# Patient Record
Sex: FEMALE | Race: WHITE | Employment: FULL TIME | URBAN - METROPOLITAN AREA
[De-identification: names, ages, dates, MRNs, and addresses within clinical notes are randomized per-mention and may not be internally consistent; named-entity substitution may affect disease eponyms.]

---

## 2017-01-09 ENCOUNTER — TELEPHONE (OUTPATIENT)
Dept: FAMILY MEDICINE CLINIC | Age: 37
End: 2017-01-09

## 2017-01-10 RX ORDER — POLYMYXIN B SULFATE AND TRIMETHOPRIM 1; 10000 MG/ML; [USP'U]/ML
1 SOLUTION OPHTHALMIC EVERY 4 HOURS
Qty: 1 BOTTLE | Refills: 0 | Status: SHIPPED | OUTPATIENT
Start: 2017-01-10 | End: 2017-01-20

## 2017-01-19 ENCOUNTER — OFFICE VISIT (OUTPATIENT)
Dept: BARIATRICS/WEIGHT MGMT | Age: 37
End: 2017-01-19

## 2017-01-19 VITALS
BODY MASS INDEX: 31.98 KG/M2 | HEART RATE: 72 BPM | RESPIRATION RATE: 15 BRPM | WEIGHT: 169.4 LBS | SYSTOLIC BLOOD PRESSURE: 119 MMHG | DIASTOLIC BLOOD PRESSURE: 77 MMHG | HEIGHT: 61 IN

## 2017-01-19 DIAGNOSIS — E11.69 DIABETES MELLITUS TYPE 2 IN OBESE (HCC): ICD-10-CM

## 2017-01-19 DIAGNOSIS — Z98.84 S/P LAPAROSCOPIC SLEEVE GASTRECTOMY: Primary | ICD-10-CM

## 2017-01-19 DIAGNOSIS — E66.01 MORBID OBESITY DUE TO EXCESS CALORIES (HCC): ICD-10-CM

## 2017-01-19 DIAGNOSIS — E66.9 DIABETES MELLITUS TYPE 2 IN OBESE (HCC): ICD-10-CM

## 2017-01-19 DIAGNOSIS — E78.5 HYPERLIPIDEMIA, UNSPECIFIED HYPERLIPIDEMIA TYPE: ICD-10-CM

## 2017-01-19 PROCEDURE — 99213 OFFICE O/P EST LOW 20 MIN: CPT | Performed by: SURGERY

## 2017-02-23 ENCOUNTER — PATIENT MESSAGE (OUTPATIENT)
Dept: FAMILY MEDICINE CLINIC | Age: 37
End: 2017-02-23

## 2017-03-03 RX ORDER — BENZONATATE 100 MG/1
100 CAPSULE ORAL 3 TIMES DAILY PRN
Qty: 30 CAPSULE | Refills: 0 | Status: SHIPPED | OUTPATIENT
Start: 2017-03-03 | End: 2017-03-13

## 2017-03-03 RX ORDER — ALBUTEROL SULFATE 90 UG/1
2 AEROSOL, METERED RESPIRATORY (INHALATION) EVERY 6 HOURS PRN
Qty: 1 INHALER | Refills: 3 | Status: SHIPPED | OUTPATIENT
Start: 2017-03-03 | End: 2017-12-15 | Stop reason: SDUPTHER

## 2017-03-06 RX ORDER — ERGOCALCIFEROL 1.25 MG/1
CAPSULE ORAL
Qty: 36 CAPSULE | Refills: 5 | Status: SHIPPED | OUTPATIENT
Start: 2017-03-06 | End: 2017-06-26 | Stop reason: SDUPTHER

## 2017-05-04 ENCOUNTER — PATIENT MESSAGE (OUTPATIENT)
Dept: BARIATRICS/WEIGHT MGMT | Age: 37
End: 2017-05-04

## 2017-05-09 ENCOUNTER — TELEPHONE (OUTPATIENT)
Dept: BARIATRICS/WEIGHT MGMT | Age: 37
End: 2017-05-09

## 2017-05-18 ENCOUNTER — OFFICE VISIT (OUTPATIENT)
Dept: URGENT CARE | Age: 37
End: 2017-05-18

## 2017-05-18 VITALS
TEMPERATURE: 97.6 F | WEIGHT: 178 LBS | DIASTOLIC BLOOD PRESSURE: 75 MMHG | HEIGHT: 60 IN | BODY MASS INDEX: 34.95 KG/M2 | HEART RATE: 80 BPM | SYSTOLIC BLOOD PRESSURE: 112 MMHG | OXYGEN SATURATION: 97 %

## 2017-05-18 DIAGNOSIS — H81.399 PERIPHERAL VERTIGO, UNSPECIFIED LATERALITY: Primary | ICD-10-CM

## 2017-05-18 PROCEDURE — 99203 OFFICE O/P NEW LOW 30 MIN: CPT | Performed by: EMERGENCY MEDICINE

## 2017-05-18 RX ORDER — MECLIZINE HYDROCHLORIDE 25 MG/1
25 TABLET ORAL 3 TIMES DAILY PRN
Qty: 20 TABLET | Refills: 0 | Status: SHIPPED | OUTPATIENT
Start: 2017-05-18 | End: 2017-06-26 | Stop reason: ALTCHOICE

## 2017-05-18 ASSESSMENT — ENCOUNTER SYMPTOMS
EYE DISCHARGE: 0
NAUSEA: 1
VOMITING: 0
DIARRHEA: 0
COUGH: 0
EYE PAIN: 0
RHINORRHEA: 0
ABDOMINAL PAIN: 0
CHANGE IN BOWEL HABIT: 0
SORE THROAT: 0
TROUBLE SWALLOWING: 0
SHORTNESS OF BREATH: 0

## 2017-06-01 RX ORDER — MONTELUKAST SODIUM 10 MG/1
TABLET ORAL
Qty: 90 TABLET | Refills: 2 | Status: SHIPPED | OUTPATIENT
Start: 2017-06-01 | End: 2018-02-26 | Stop reason: SDUPTHER

## 2017-06-19 ENCOUNTER — OFFICE VISIT (OUTPATIENT)
Dept: BARIATRICS/WEIGHT MGMT | Age: 37
End: 2017-06-19

## 2017-06-19 VITALS
WEIGHT: 179.8 LBS | BODY MASS INDEX: 33.95 KG/M2 | HEART RATE: 72 BPM | DIASTOLIC BLOOD PRESSURE: 72 MMHG | SYSTOLIC BLOOD PRESSURE: 118 MMHG | HEIGHT: 61 IN

## 2017-06-19 DIAGNOSIS — E66.9 CLASS 1 OBESITY: Primary | ICD-10-CM

## 2017-06-19 DIAGNOSIS — E78.5 HYPERLIPIDEMIA, UNSPECIFIED HYPERLIPIDEMIA TYPE: ICD-10-CM

## 2017-06-19 DIAGNOSIS — Z79.899 HIGH RISK MEDICATIONS (NOT ANTICOAGULANTS) LONG-TERM USE: ICD-10-CM

## 2017-06-19 DIAGNOSIS — E11.9 TYPE 2 DIABETES MELLITUS WITHOUT COMPLICATION, UNSPECIFIED LONG TERM INSULIN USE STATUS: ICD-10-CM

## 2017-06-19 DIAGNOSIS — Z98.84 S/P LAPAROSCOPIC SLEEVE GASTRECTOMY: ICD-10-CM

## 2017-06-19 PROCEDURE — 99215 OFFICE O/P EST HI 40 MIN: CPT | Performed by: FAMILY MEDICINE

## 2017-06-19 ASSESSMENT — PATIENT HEALTH QUESTIONNAIRE - PHQ9
SUM OF ALL RESPONSES TO PHQ QUESTIONS 1-9: 0
2. FEELING DOWN, DEPRESSED OR HOPELESS: 0
SUM OF ALL RESPONSES TO PHQ9 QUESTIONS 1 & 2: 0
1. LITTLE INTEREST OR PLEASURE IN DOING THINGS: 0

## 2017-06-19 ASSESSMENT — ENCOUNTER SYMPTOMS
RESPIRATORY NEGATIVE: 1
EYES NEGATIVE: 1
GASTROINTESTINAL NEGATIVE: 1

## 2017-06-21 LAB
A/G RATIO: 1.6 (ref 1.1–2.2)
ALBUMIN SERPL-MCNC: 4.6 G/DL (ref 3.4–5)
ALP BLD-CCNC: 69 U/L (ref 40–129)
ALT SERPL-CCNC: 12 U/L (ref 10–40)
ANION GAP SERPL CALCULATED.3IONS-SCNC: 18 MMOL/L (ref 3–16)
AST SERPL-CCNC: 13 U/L (ref 15–37)
BASOPHILS ABSOLUTE: 0 K/UL (ref 0–0.2)
BASOPHILS RELATIVE PERCENT: 0.4 %
BILIRUB SERPL-MCNC: 0.7 MG/DL (ref 0–1)
BUN BLDV-MCNC: 12 MG/DL (ref 7–20)
CALCIUM SERPL-MCNC: 9.6 MG/DL (ref 8.3–10.6)
CHLORIDE BLD-SCNC: 100 MMOL/L (ref 99–110)
CHOLESTEROL, TOTAL: 172 MG/DL (ref 0–199)
CO2: 23 MMOL/L (ref 21–32)
CREAT SERPL-MCNC: 0.5 MG/DL (ref 0.6–1.1)
EOSINOPHILS ABSOLUTE: 0.2 K/UL (ref 0–0.6)
EOSINOPHILS RELATIVE PERCENT: 2 %
ESTIMATED AVERAGE GLUCOSE: 111.2 MG/DL
FERRITIN: 51.4 NG/ML (ref 15–150)
FOLATE: >20 NG/ML (ref 4.78–24.2)
GFR AFRICAN AMERICAN: >60
GFR NON-AFRICAN AMERICAN: >60
GLOBULIN: 2.8 G/DL
GLUCOSE BLD-MCNC: 115 MG/DL (ref 70–99)
HBA1C MFR BLD: 5.5 %
HCT VFR BLD CALC: 44.3 % (ref 36–48)
HDLC SERPL-MCNC: 51 MG/DL (ref 40–60)
HEMOGLOBIN: 14.9 G/DL (ref 12–16)
IRON SATURATION: 59 % (ref 15–50)
IRON: 175 UG/DL (ref 37–145)
LDL CHOLESTEROL CALCULATED: 101 MG/DL
LYMPHOCYTES ABSOLUTE: 3.1 K/UL (ref 1–5.1)
LYMPHOCYTES RELATIVE PERCENT: 39.6 %
MCH RBC QN AUTO: 31 PG (ref 26–34)
MCHC RBC AUTO-ENTMCNC: 33.7 G/DL (ref 31–36)
MCV RBC AUTO: 92.2 FL (ref 80–100)
MONOCYTES ABSOLUTE: 0.4 K/UL (ref 0–1.3)
MONOCYTES RELATIVE PERCENT: 5.2 %
NEUTROPHILS ABSOLUTE: 4.1 K/UL (ref 1.7–7.7)
NEUTROPHILS RELATIVE PERCENT: 52.8 %
PDW BLD-RTO: 12.4 % (ref 12.4–15.4)
PLATELET # BLD: 215 K/UL (ref 135–450)
PMV BLD AUTO: 10 FL (ref 5–10.5)
POTASSIUM SERPL-SCNC: 4.5 MMOL/L (ref 3.5–5.1)
RBC # BLD: 4.81 M/UL (ref 4–5.2)
SODIUM BLD-SCNC: 141 MMOL/L (ref 136–145)
TOTAL IRON BINDING CAPACITY: 296 UG/DL (ref 260–445)
TOTAL PROTEIN: 7.4 G/DL (ref 6.4–8.2)
TRIGL SERPL-MCNC: 98 MG/DL (ref 0–150)
TSH REFLEX: 1.11 UIU/ML (ref 0.27–4.2)
VITAMIN B-12: 487 PG/ML (ref 211–911)
VITAMIN D 25-HYDROXY: 38.3 NG/ML
VLDLC SERPL CALC-MCNC: 20 MG/DL
WBC # BLD: 7.7 K/UL (ref 4–11)

## 2017-06-26 ENCOUNTER — OFFICE VISIT (OUTPATIENT)
Dept: DERMATOLOGY | Age: 37
End: 2017-06-26

## 2017-06-26 DIAGNOSIS — Z12.83 SCREENING EXAM FOR SKIN CANCER: ICD-10-CM

## 2017-06-26 DIAGNOSIS — Z80.8 FAMILY HISTORY OF MELANOMA: ICD-10-CM

## 2017-06-26 DIAGNOSIS — D48.5 NEOPLASM OF UNCERTAIN BEHAVIOR OF SKIN: ICD-10-CM

## 2017-06-26 DIAGNOSIS — D22.9 MULTIPLE BENIGN NEVI: Primary | ICD-10-CM

## 2017-06-26 PROCEDURE — 11100 PR BIOPSY OF SKIN LESION: CPT | Performed by: DERMATOLOGY

## 2017-06-26 PROCEDURE — 99213 OFFICE O/P EST LOW 20 MIN: CPT | Performed by: DERMATOLOGY

## 2017-06-28 ENCOUNTER — OFFICE VISIT (OUTPATIENT)
Dept: BARIATRICS/WEIGHT MGMT | Age: 37
End: 2017-06-28

## 2017-06-28 VITALS
BODY MASS INDEX: 33.89 KG/M2 | WEIGHT: 179.5 LBS | SYSTOLIC BLOOD PRESSURE: 100 MMHG | DIASTOLIC BLOOD PRESSURE: 78 MMHG | HEIGHT: 61 IN | HEART RATE: 76 BPM

## 2017-06-28 DIAGNOSIS — Z98.84 S/P LAPAROSCOPIC SLEEVE GASTRECTOMY: ICD-10-CM

## 2017-06-28 DIAGNOSIS — Z71.3 DIETARY COUNSELING AND SURVEILLANCE: ICD-10-CM

## 2017-06-28 DIAGNOSIS — E66.9 CLASS 1 OBESITY: Primary | ICD-10-CM

## 2017-06-28 DIAGNOSIS — Z79.899 HIGH RISK MEDICATIONS (NOT ANTICOAGULANTS) LONG-TERM USE: ICD-10-CM

## 2017-06-28 PROCEDURE — 99214 OFFICE O/P EST MOD 30 MIN: CPT | Performed by: FAMILY MEDICINE

## 2017-06-28 ASSESSMENT — ENCOUNTER SYMPTOMS
RESPIRATORY NEGATIVE: 1
EYES NEGATIVE: 1
GASTROINTESTINAL NEGATIVE: 1

## 2017-06-30 ENCOUNTER — TELEPHONE (OUTPATIENT)
Dept: DERMATOLOGY | Age: 37
End: 2017-06-30

## 2017-07-28 ENCOUNTER — OFFICE VISIT (OUTPATIENT)
Dept: BARIATRICS/WEIGHT MGMT | Age: 37
End: 2017-07-28

## 2017-07-28 VITALS
HEIGHT: 61 IN | BODY MASS INDEX: 33.23 KG/M2 | SYSTOLIC BLOOD PRESSURE: 112 MMHG | DIASTOLIC BLOOD PRESSURE: 70 MMHG | WEIGHT: 176 LBS | RESPIRATION RATE: 16 BRPM | HEART RATE: 96 BPM

## 2017-07-28 DIAGNOSIS — E66.9 OBESITY (BMI 30.0-34.9): Primary | ICD-10-CM

## 2017-07-28 DIAGNOSIS — E78.5 HYPERLIPIDEMIA, UNSPECIFIED HYPERLIPIDEMIA TYPE: ICD-10-CM

## 2017-07-28 DIAGNOSIS — E66.9 DIABETES MELLITUS TYPE 2 IN OBESE (HCC): ICD-10-CM

## 2017-07-28 DIAGNOSIS — E11.69 DIABETES MELLITUS TYPE 2 IN OBESE (HCC): ICD-10-CM

## 2017-07-28 DIAGNOSIS — E55.9 VITAMIN D DEFICIENCY: ICD-10-CM

## 2017-07-28 DIAGNOSIS — Z98.84 S/P LAPAROSCOPIC SLEEVE GASTRECTOMY: ICD-10-CM

## 2017-07-28 PROCEDURE — 99213 OFFICE O/P EST LOW 20 MIN: CPT | Performed by: NURSE PRACTITIONER

## 2017-07-28 ASSESSMENT — ENCOUNTER SYMPTOMS
RESPIRATORY NEGATIVE: 1
GASTROINTESTINAL NEGATIVE: 1
EYES NEGATIVE: 1

## 2017-08-18 ENCOUNTER — OFFICE VISIT (OUTPATIENT)
Dept: FAMILY MEDICINE CLINIC | Age: 37
End: 2017-08-18

## 2017-08-18 VITALS
BODY MASS INDEX: 33.42 KG/M2 | OXYGEN SATURATION: 98 % | HEART RATE: 72 BPM | WEIGHT: 177 LBS | SYSTOLIC BLOOD PRESSURE: 110 MMHG | HEIGHT: 61 IN | DIASTOLIC BLOOD PRESSURE: 70 MMHG

## 2017-08-18 DIAGNOSIS — H81.12 BPPV (BENIGN PAROXYSMAL POSITIONAL VERTIGO), LEFT: Primary | ICD-10-CM

## 2017-08-18 PROCEDURE — 99213 OFFICE O/P EST LOW 20 MIN: CPT | Performed by: FAMILY MEDICINE

## 2017-08-18 RX ORDER — ERYTHROMYCIN 5 MG/G
OINTMENT OPHTHALMIC
Refills: 4 | COMMUNITY
Start: 2017-08-07 | End: 2017-10-02

## 2017-08-18 RX ORDER — ONDANSETRON 4 MG/1
4 TABLET, FILM COATED ORAL EVERY 8 HOURS PRN
Qty: 20 TABLET | Refills: 0 | Status: SHIPPED | OUTPATIENT
Start: 2017-08-18 | End: 2018-10-27 | Stop reason: ALTCHOICE

## 2017-08-21 ENCOUNTER — OFFICE VISIT (OUTPATIENT)
Dept: BARIATRICS/WEIGHT MGMT | Age: 37
End: 2017-08-21

## 2017-08-21 ENCOUNTER — HOSPITAL ENCOUNTER (OUTPATIENT)
Dept: PHYSICAL THERAPY | Age: 37
Discharge: HOME | End: 2017-08-31
Admitting: FAMILY MEDICINE

## 2017-08-21 VITALS
HEIGHT: 61 IN | WEIGHT: 175 LBS | SYSTOLIC BLOOD PRESSURE: 110 MMHG | BODY MASS INDEX: 33.04 KG/M2 | HEART RATE: 72 BPM | DIASTOLIC BLOOD PRESSURE: 72 MMHG

## 2017-08-21 DIAGNOSIS — E66.9 CLASS 1 OBESITY: Primary | ICD-10-CM

## 2017-08-21 DIAGNOSIS — E66.9 OBESITY (BMI 30.0-34.9): ICD-10-CM

## 2017-08-21 DIAGNOSIS — Z71.3 DIETARY COUNSELING AND SURVEILLANCE: ICD-10-CM

## 2017-08-21 PROCEDURE — 99213 OFFICE O/P EST LOW 20 MIN: CPT | Performed by: FAMILY MEDICINE

## 2017-08-21 ASSESSMENT — ENCOUNTER SYMPTOMS
GASTROINTESTINAL NEGATIVE: 1
EYES NEGATIVE: 1
RESPIRATORY NEGATIVE: 1

## 2017-08-28 ENCOUNTER — HOSPITAL ENCOUNTER (OUTPATIENT)
Dept: PHYSICAL THERAPY | Age: 37
Discharge: HOME | End: 2017-08-28

## 2017-09-01 ENCOUNTER — HOSPITAL ENCOUNTER (OUTPATIENT)
Dept: PHYSICAL THERAPY | Age: 37
Discharge: HOME | End: 2017-09-01

## 2017-09-05 ENCOUNTER — HOSPITAL ENCOUNTER (OUTPATIENT)
Dept: PHYSICAL THERAPY | Age: 37
Discharge: HOME | End: 2017-09-05

## 2017-10-02 ENCOUNTER — OFFICE VISIT (OUTPATIENT)
Dept: BARIATRICS/WEIGHT MGMT | Age: 37
End: 2017-10-02

## 2017-10-02 VITALS
HEART RATE: 76 BPM | SYSTOLIC BLOOD PRESSURE: 128 MMHG | WEIGHT: 176 LBS | DIASTOLIC BLOOD PRESSURE: 86 MMHG | BODY MASS INDEX: 33.23 KG/M2 | HEIGHT: 61 IN

## 2017-10-02 DIAGNOSIS — E66.9 CLASS 1 OBESITY: Primary | ICD-10-CM

## 2017-10-02 DIAGNOSIS — Z71.3 DIETARY COUNSELING AND SURVEILLANCE: ICD-10-CM

## 2017-10-02 PROCEDURE — 99213 OFFICE O/P EST LOW 20 MIN: CPT | Performed by: FAMILY MEDICINE

## 2017-10-02 ASSESSMENT — ENCOUNTER SYMPTOMS
EYES NEGATIVE: 1
RESPIRATORY NEGATIVE: 1
GASTROINTESTINAL NEGATIVE: 1

## 2017-10-02 NOTE — MR AVS SNAPSHOT
After Visit Summary             Brigette Moreira   10/2/2017 9:15 AM   Office Visit    Description:  Female : 1980   Provider:  Danielle Winters MD   Department:  [facility]              Your Follow-Up and Future Appointments         Below is a list of your follow-up and future appointments. This may not be a complete list as you may have made appointments directly with providers that we are not aware of or your providers may have made some for you. Please call your providers to confirm appointments. It is important to keep your appointments. Please bring your current insurance card, photo ID, co-pay, and all medication bottles to your appointment. If self-pay, payment is expected at the time of service. Your To-Do List     Future Appointments Provider Department Dept Phone    11/3/2017 12:15 PM Danielle Winters MD [facility] [phone]    Please arrive 15 minutes prior to appointment time, bring insurance card and photo ID.     2017 9:00 AM Tia Veliz MD [facility]in[facility] Diabetes [phone]    Please arrive 15 minutes prior to appointment time, bring insurance card and photo ID.     2018 4:00 PM Radha Ramey MD [facility] Dermatology [phone]    Please arrive 15 minutes prior to appointment, bring photo ID and insurance card. Follow-Up    Return in about 4 weeks (around 10/30/2017).          Information from Your Visit        Department     Name Address Phone Fax    [address] [facility] [address] [place] [address] [address] [facility],Third Floor [phone]      You Were Seen for:         Comments    Class 1 obesity   [[id number]]         Vital Signs     Blood Pressure Pulse Height Weight Body Mass Index Smoking Status    128/86 76 5' 0.5\" (1.537 m) 176 lb (79.8 kg) 33.81 kg/m2 Never Smoker      Additional Information about your Body Mass Index (BMI) eat, how active you are, and other things affect how your body uses calories and whether you gain weight. If you have family members who have too much body fat, you may have inherited a tendency to gain weight. And your family also helps form your eating and lifestyle habits, which can lead to obesity. Also, our busy lives make it harder to plan and cook healthy meals. For many of us, it's easier to reach for prepared foods, go out to eat, or go to the drive-through. But these foods are often high in saturated fat and calories. Portions are often too large. What can you do to reach a healthy weight? Focus on health, not diets. Diets are hard to stay on and don't work in the long run. It is very hard to stay with a diet that includes lots of big changes in your eating habits. Instead of a diet, focus on lifestyle changes that will improve your health and achieve the right balance of energy and calories. To lose weight, you need to burn more calories than you take in. You can do it by eating healthy foods in reasonable amounts and becoming more active, even a little bit every day. Making small changes over time can add up to a lot. Make a plan for change. Many people have found that naming their reasons for change and staying focused on their plan can make a big difference. Work with your doctor to create a plan that is right for you. · Ask yourself: [place] are my personal, most powerful reasons for wanting this change? What will my life look like when I've made the change? \"  · Set your long-term goal. Make it specific, such as \"I will lose x pounds. \"  · Break your long-term goal into smaller, short-term goals. Make these small steps specific and within your reach, things you know you can do. These steps are what keep you going from day to day. How can you stay on your plan for change? Be ready.  Choose to start during a time when there are few events that problems. It's also a good idea to know your test results and keep a list of the medicines you take. Where can you learn more? Go to https://chpepiceweb.HauteDay. org and sign in to your Oportunista account. Enter N111 in the [employer] box to learn more about \"Learning About Obesity. \"     If you do not have an account, please click on the \"Sign Up Now\" link. Current as of: October 13, 2016  Content Version: 11.3  © 6794-3597 [employer]. Care instructions adapted under license by [facility] ([facility]). If you have questions about a medical condition or this instruction, always ask your healthcare professional. [place] [address] any warranty or liability for your use of this information. Today's Medication Changes          These changes are accurate as of: 10/2/17 10:13 AM.  If you have any questions, ask your nurse or doctor. STOP taking these medications           erythromycin 5 MG/GM ophthalmic ointment   Commonly known as:  ROMYCIN   Stopped by:  Gabrielle Felton MD               Your Current Medications Are              ondansetron (ZOFRAN) 4 MG tablet Take 1 tablet by mouth every 8 hours as needed for Nausea    montelukast (SINGULAIR) 10 MG tablet TAKE 1 TABLET DAILY    albuterol sulfate HFA (PROAIR HFA) 108 (90 BASE) MCG/ACT inhaler Inhale 2 puffs into the lungs every 6 hours as needed for Wheezing    zolpidem (AMBIEN) 5 MG tablet TAKE ONE TABLET BY MOUTH EVERY NIGHT AS NEEDED FOR SLEEP. Multiple Vitamin (ONE-A-DAY ESSENTIAL PO) Take 2 tablets by mouth daily    SYMBICORT 80-4.5 MCG/ACT AERO USE 2 INHALATIONS TWICE A DAY    Calcium Citrate-Vitamin D 200-250 MG-UNIT TABS Take 1 tablet by mouth daily    calcium carbonate (OSCAL) 500 MG TABS tablet Take 500 mg by mouth daily    omeprazole (PRILOSEC) 40 MG capsule Take 1 capsule by mouth daily    levonorgestrel (MIRENA) 20 MCG/24HR IUD 1 Each by Intrauterine route once.       Allergies Codeine Nausea And Vomiting    Elavil [Amitriptyline] Nausea And Vomiting         Additional Information        Basic Information     Date Of Birth Sex Race Ethnicity Preferred Language    1980 Female White Non-/Non  English      Problem List as of 10/2/2017  Date Reviewed: 10/2/2017                Morbid obesity ([facility] [address]    Diabetes mellitus type 2 in obese ([facility] [address]    Hypertriglyceridemia    Hyperlipidemia    Vitamin D deficiency    Asthma    Obesity (BMI 30.0-34. 9)    Shoulder impingement    S/P laparoscopic sleeve gastrectomy    Right upper quadrant abdominal pain    Nausea    Hip flexor tendinitis      Your Goals as of 10/2/2017 at 10:13 AM              6/20/17    9/16/16    3/11/16       Result Component    HEMOGLOBIN A1C < 7.0   5.5  5.1  5.8    Notes    The medications were discussed with the patient and the physician. Prescription and over the counter medicines reviewed. Pt is taking medication as prescribed any any side effects or barriers such as difficulty in taking the medication reveiwed. The purpose, side effects, dose of medication of new medications were explained to the patient and questions answered by the physician. The patients understands the information concerning medication. Individual treatment plan developed:Self-management plan and goals developed and discussed. Resources given. See patient instructions. Medication treatment plan developed by the physician. See orders. Healthy behavior discussed: Preventative behaviors discussed regarding healthy diet, exercise, and not smoking. Pt already has tool to record self care of BS or high blood pressure results, regular paper ok. Self management confidence of patient being able to put goal into action assessed: medium confidence. Barriers to treatment evaluated:none unless otherwise noted in the HPI.               Immunizations as of 10/2/2017     Name Date

## 2017-10-02 NOTE — PATIENT INSTRUCTIONS
Learning About Obesity  What is obesity? Obesity means having so much body fat that your health is in danger. Having too much body fat can lead to type 2 diabetes, heart disease, high blood pressure, arthritis, sleep apnea, and stroke. Even if you don't feel bad now, think about these health risks. Do they seem like a good reason to start on a new path toward a healthier weight? Or do you have another personal, powerful reason for wanting to lose weight? Whatever it is, keep it in mind. It can be hard to change eating habits and exercise habits. But with your own reason and plan, you can do it. How do you know if your weight is in the obesity range? To know if your weight is in the obesity range, your doctor looks at your body mass index (BMI) and waist size. Your BMI is a number that is calculated from your weight and your height. To figure your BMI for yourself, get a BMI table from your doctor or use an online tool, such as http://www.OnAir Player.com/ on the [facility] [place]. What causes obesity? When you take in more calories than you burn off, you gain weight. How you eat, how active you are, and other things affect how your body uses calories and whether you gain weight. If you have family members who have too much body fat, you may have inherited a tendency to gain weight. And your family also helps form your eating and lifestyle habits, which can lead to obesity. Also, our busy lives make it harder to plan and cook healthy meals. For many of us, it's easier to reach for prepared foods, go out to eat, or go to the drive-through. But these foods are often high in saturated fat and calories. Portions are often too large. What can you do to reach a healthy weight? Focus on health, not diets. Diets are hard to stay on and don't work in the long run.  It is very hard to stay with a diet that includes lots of big changes in your eating habits. Instead of a diet, focus on lifestyle changes that will improve your health and achieve the right balance of energy and calories. To lose weight, you need to burn more calories than you take in. You can do it by eating healthy foods in reasonable amounts and becoming more active, even a little bit every day. Making small changes over time can add up to a lot. Make a plan for change. Many people have found that naming their reasons for change and staying focused on their plan can make a big difference. Work with your doctor to create a plan that is right for you. · Ask yourself: Scott Beck are my personal, most powerful reasons for wanting this change? What will my life look like when I've made the change? \"  · Set your long-term goal. Make it specific, such as \"I will lose x pounds. \"  · Break your long-term goal into smaller, short-term goals. Make these small steps specific and within your reach, things you know you can do. These steps are what keep you going from day to day. How can you stay on your plan for change? Be ready. Choose to start during a time when there are few events that might trigger slip-ups, like holidays, social events, and high-stress periods. Decide on your first few steps. Most people have more success when they make small changes, one step at a time. For example, you might switch a daily candy bar to a piece of fruit, walk 10 minutes more, or add more vegetables to a meal.  Line up your support people. Make sure you're not going to be alone as you make this change. Connect with people who understand how important it is to you. Ask family members and friends for help in keeping with your plan. And think about who could make it harder for you, and how to handle them. Try tracking. People who keep track of what they eat, feel, and do are better at losing weight. Try writing down things like:  · What and how much you eat.   · How you feel before and after each meal.  · Details about each meal (like eating out or at home, eating alone, or with friends or family). · What you do to be active. Look and plan. As you track, look for patterns that you may want to change. Take note of:  · When you eat and whether you skip meals. · How often you eat out. · How many fruits and vegetables you eat. · When you eat beyond feeling full. · When and why you eat for reasons other than being hungry. When you stray from your plan, don't get upset. Figure out what made you slip up and how you can fix it. Can you take medicines or have surgery to lose weight? Before your doctor will prescribe medicines or surgery, he or she will probably want you to be more active and follow your healthy eating plan for a period of time. These habits are key lifelong changes for managing your weight, with or without other medical treatment. And these changes can help you avoid weight-related health problems. Follow-up care is a key part of your treatment and safety. Be sure to make and go to all appointments, and call your doctor if you are having problems. It's also a good idea to know your test results and keep a list of the medicines you take. Where can you learn more? Go to https://Intertainment MediapeMozy.Productify. org and sign in to your AppGate Network Security account. Enter N111 in the [facility] box to learn more about \"Learning About Obesity. \"     If you do not have an account, please click on the \"Sign Up Now\" link. Current as of: October 13, 2016  Content Version: 11.3  © 4126-9501 [employer]. Care instructions adapted under license by [facility] ([facility]). If you have questions about a medical condition or this instruction, always ask your healthcare professional. [address] any warranty or liability for your use of this information.

## 2017-10-02 NOTE — PROGRESS NOTES
Patient: Cristal Moreira                      Encounter Date: 10/2/2017    YOB: 1980                Age: 42 y.o. Chief Complaint   Patient presents with    Weight Management     5th MWM, Belviq, s/p sleeve 10/2015       /86  Pulse 76  Ht 5' 0.5\" (1.537 m)  Wt 176 lb (79.8 kg)  BMI 33.81 kg/m2    Body mass index is 33.81 kg/(m^2). Waist Circumference  Waist (Inches): 39 in    HPI:  43 y.o. female with a long-standing history of obesity s/p sleeve 10/2015 presents today for follow-up. She has gained 1 pounds since her last visit on 8/21. Current treatment includes Belviq and 1200-calorie, low carb diet. Tolerating it well. Met with the dietitian today. Food recall reviewed. Hasn't been making consistent dietary changes. Medication(s): Appetite well controlled? [x]Fair    Focus:     []Good     [x]Fair     []Poor        Side effects? No       Any recent change in medication(s)?  No    Exercise: [x]Cardio     []Resistance/strength training     []Other:     Allergies   Allergen Reactions    Codeine Nausea And Vomiting    Elavil [Amitriptyline] Nausea And Vomiting         Current Outpatient Prescriptions:     ondansetron (ZOFRAN) 4 MG tablet, Take 1 tablet by mouth every 8 hours as needed for Nausea, Disp: 20 tablet, Rfl: 0    montelukast (SINGULAIR) 10 MG tablet, TAKE 1 TABLET DAILY, Disp: 90 tablet, Rfl: 2    albuterol sulfate HFA (PROAIR HFA) 108 (90 BASE) MCG/ACT inhaler, Inhale 2 puffs into the lungs every 6 hours as needed for Wheezing, Disp: 1 Inhaler, Rfl: 3    zolpidem (AMBIEN) 5 MG tablet, TAKE ONE TABLET BY MOUTH EVERY NIGHT AS NEEDED FOR SLEEP., Disp: 30 tablet, Rfl: 2    Multiple Vitamin (ONE-A-DAY ESSENTIAL PO), Take 2 tablets by mouth daily, Disp: , Rfl:     SYMBICORT 80-4.5 MCG/ACT AERO, USE 2 INHALATIONS TWICE A DAY, Disp: 30.6 g, Rfl: 1    Calcium Citrate-Vitamin D 200-250 MG-UNIT TABS, Take 1 tablet by mouth daily, Disp: , Rfl:     calcium carbonate (OSCAL) 5.7-6.4%  Glycemic Control: Nonpregnant Adults: <7.0%                    Pregnant: <6.0%        eAG 06/20/2017 111.2  mg/dL Final    Vitamin B-12 06/20/2017 487  211 - 911 pg/mL Final    Folate 06/20/2017 >20.00  4.78 - 24.20 ng/mL Final    Comment: Effective 11-15-16 10:00am EST  Please note reference ranges have  changed for Folate.  Vit D, 25-Hydroxy 06/20/2017 38.3  >=30 ng/mL Final    Comment: <=20 ng/mL. ........... Jenness Atif Deficient  21-29 ng/mL. ......... Jenness Atif Insufficient  >=30 ng/mL. ........ Jenness Atif Sufficient      Cholesterol, Total 06/20/2017 172  0 - 199 mg/dL Final    Triglycerides 06/20/2017 98  0 - 150 mg/dL Final    HDL 06/20/2017 51  40 - 60 mg/dL Final    LDL Calculated 06/20/2017 101* <100 mg/dL Final    VLDL CHOLESTEROL CALCULATED 06/20/2017 20  Not Established mg/dL Final    Iron 06/20/2017 175* 37 - 145 ug/dL Final    TIBC 06/20/2017 296  260 - 445 ug/dL Final    Iron Saturation 06/20/2017 59* 15 - 50 % Final    Ferritin 06/20/2017 51.4  15.0 - 150.0 ng/mL Final    TSH 06/20/2017 1.11  0.27 - 4.20 uIU/mL Final         Assessment and Plan:    ICD-10-CM ICD-9-CM    1. Class 1 obesity E66.9 278.00 Unfortunately the patient did not meet her weight loss goal for the last 12 weeks. Discontinue Belviq. Discussed other possible aom ([address]) but pt not interested. Focus on 1200-Nigel/low carb meal plan. 2. BMI 33.0-33.9,adult Z68.33 V85.33    3. Dietary counseling and surveillance Z71.3 V65.3 Greater than 50% of this 15 minute visit was used in direct counseling.          Nutrition Plan: [] LCHF/Ketogenic   [x] Modified low-calorie diet (low carb/low-nigel)               [] Low-calorie diet    []Maintenance       []Other        Exercise: [x] Cardio     [x] Resistance/strength training                       [x] ACSM recommendations (150 minutes/week in active weight loss)                   [] Prevention of weight gain (150-250 minutes/week)           Behavior: [x]Motivational interviewing performed    [] Referral for counseling                         [x]Discussed strategies to overcome habits/challenges for focus         [x] Stress management   [x] Stimulus control      Reviewed:  [x] Nutrition and the importance of regular protein intake  [x] Hidden carbohydrate sources  [x] Alcohol use  [x] Tobacco use   [x] Drug use- Denies  [x] Importance of exercise and reducing sedentary time  [x] Treatment consent- Patient understands and agrees with the treatment plan   [x] Proper use of medication and side effects  [x] OARRS report        Treatment start date: 6/29/17  12 weeks: 9/29/17  Starting weight: 179 pounds  Goal: At least 5% (8.5 pounds)- Goal not met  Total weight loss: 3.5    Key dietary points:    - Meats (preferably organic or grass fed) are great sources of protein and have no carbohydrates. - Recommend coconut, olive, avocado, or almond oils. - When buying dairy, choose regular or full fat options. - Choose vegetables that grow above ground as they are generally lower in carbohydrates and higher in fiber.  - Avoid starches such as bread, rice, potatoes, pasta and all sources of simple sugars (desserts, soda, breakfast cereals). - Choose beverages that are calorie and sugar free. No orders of the defined types were placed in this encounter. No Follow-up on file. This dictation was performed with a verbal recognition program (Dragon) and all efforts were made to ensure accuracy of this dictation. It is possible that there are still dictated errors within this note. If so, please bring any errors to my attention for correction.

## 2017-10-11 ENCOUNTER — OFFICE VISIT (OUTPATIENT)
Dept: URGENT CARE | Age: 37
End: 2017-10-11

## 2017-10-11 VITALS
SYSTOLIC BLOOD PRESSURE: 123 MMHG | BODY MASS INDEX: 34.36 KG/M2 | HEIGHT: 60 IN | DIASTOLIC BLOOD PRESSURE: 75 MMHG | OXYGEN SATURATION: 100 % | HEART RATE: 79 BPM | WEIGHT: 175 LBS | TEMPERATURE: 97.4 F

## 2017-10-11 DIAGNOSIS — J00 ACUTE NASOPHARYNGITIS: Primary | ICD-10-CM

## 2017-10-11 PROCEDURE — 99214 OFFICE O/P EST MOD 30 MIN: CPT | Performed by: EMERGENCY MEDICINE

## 2017-10-11 RX ORDER — BENZONATATE 100 MG/1
200 CAPSULE ORAL 3 TIMES DAILY PRN
Qty: 30 CAPSULE | Refills: 0 | Status: SHIPPED | OUTPATIENT
Start: 2017-10-11 | End: 2017-10-16

## 2017-10-11 ASSESSMENT — ENCOUNTER SYMPTOMS
VOMITING: 0
RHINORRHEA: 1
ABDOMINAL PAIN: 0
NAUSEA: 0
SINUS PAIN: 0
SORE THROAT: 0
COUGH: 1

## 2017-10-11 NOTE — PROGRESS NOTES
Subjective:      Patient ID: Lino Elias is a 42 y.o. female. URI    This is a new problem. Episode onset: One week ago. The problem has been waxing and waning. There has been no fever. Associated symptoms include congestion, coughing, headaches and rhinorrhea. Pertinent negatives include no abdominal pain, nausea, sinus pain, sore throat or vomiting. She has tried decongestant for the symptoms. The treatment provided mild relief. Review of Systems   HENT: Positive for congestion and rhinorrhea. Negative for sinus pain and sore throat. Respiratory: Positive for cough. Gastrointestinal: Negative for abdominal pain, nausea and vomiting. Neurological: Positive for headaches. All other systems reviewed and are negative. Objective:   Physical Exam   Constitutional: She is oriented to person, place, and time. She appears well-developed and well-nourished. HENT:   Head: Normocephalic and atraumatic. Right Ear: External ear normal.   Left Ear: External ear normal.   Nose: Nose normal.   Mouth/Throat: Oropharynx is clear and moist.   Eyes: Conjunctivae and EOM are normal. Pupils are equal, round, and reactive to light. Neck: Normal range of motion. Neck supple. Cardiovascular: Normal rate, regular rhythm, normal heart sounds and intact distal pulses. Pulmonary/Chest: Effort normal and breath sounds normal. No respiratory distress. She has no wheezes. She has no rales. Musculoskeletal: Normal range of motion. Neurological: She is alert and oriented to person, place, and time. She has normal reflexes. No cranial nerve deficit. She exhibits normal muscle tone. Skin: Skin is warm and dry. Psychiatric: She has a normal mood and affect. Her behavior is normal. Judgment and thought content normal.   Nursing note and vitals reviewed. Assessment / Plan:      1. Acute nasopharyngitis      Elvira was seen today for head congestion.     Diagnoses and all orders for this

## 2017-10-11 NOTE — PATIENT INSTRUCTIONS
Follow-up with your primary care physician as needed. If you do not have a primary care physician, call [phone] for a referral or visit [email or web address]    Patient Education        Viral Respiratory Infection: Care Instructions  Your Care Instructions  Viruses are very small organisms. They grow in number after they enter your body. There are many types that cause different illnesses, such as colds and the mumps. The symptoms of a viral respiratory infection often start quickly. They include a fever, sore throat, and runny nose. You may also just not feel well. Or you may not want to eat much. Most viral respiratory infections are not serious. They usually get better with time and self-care. Antibiotics are not used to treat a viral infection. That's because antibiotics will not help cure a viral illness. In some cases, antiviral medicine can help your body fight a serious viral infection. Follow-up care is a key part of your treatment and safety. Be sure to make and go to all appointments, and call your doctor if you are having problems. It's also a good idea to know your test results and keep a list of the medicines you take. How can you care for yourself at home? · Rest as much as possible until you feel better. · Be safe with medicines. Take your medicine exactly as prescribed. Call your doctor if you think you are having a problem with your medicine. You will get more details on the specific medicine your doctor prescribes. · Take an over-the-counter pain medicine, such as acetaminophen (Tylenol), ibuprofen (Advil, Motrin), or naproxen (Aleve), as needed for pain and fever. Read and follow all instructions on the label. Do not give aspirin to anyone younger than 20. It has been linked to Reye syndrome, a serious illness. · Drink plenty of fluids, enough so that your urine is light yellow or clear like water.  Hot fluids, such as tea or soup, may help relieve congestion in your nose and always ask your healthcare professional. [place] [address] any warranty or liability for your use of this information.

## 2017-10-16 ENCOUNTER — OFFICE VISIT (OUTPATIENT)
Dept: FAMILY MEDICINE CLINIC | Age: 37
End: 2017-10-16

## 2017-10-16 VITALS
BODY MASS INDEX: 34.1 KG/M2 | OXYGEN SATURATION: 99 % | WEIGHT: 180.6 LBS | RESPIRATION RATE: 13 BRPM | SYSTOLIC BLOOD PRESSURE: 102 MMHG | TEMPERATURE: 99.2 F | DIASTOLIC BLOOD PRESSURE: 68 MMHG | HEIGHT: 61 IN | HEART RATE: 86 BPM

## 2017-10-16 DIAGNOSIS — L30.9 DERMATITIS: Primary | ICD-10-CM

## 2017-10-16 DIAGNOSIS — J06.9 VIRAL URI: ICD-10-CM

## 2017-10-16 PROCEDURE — 99213 OFFICE O/P EST LOW 20 MIN: CPT | Performed by: NURSE PRACTITIONER

## 2017-10-16 RX ORDER — METHYLPREDNISOLONE 4 MG/1
TABLET ORAL
Qty: 1 KIT | Refills: 0 | Status: SHIPPED | OUTPATIENT
Start: 2017-10-16 | End: 2017-10-22

## 2017-10-16 ASSESSMENT — ENCOUNTER SYMPTOMS
CONSTIPATION: 0
CHEST TIGHTNESS: 0
NAUSEA: 0
SINUS PRESSURE: 1
COUGH: 1
SHORTNESS OF BREATH: 0

## 2017-10-16 NOTE — PROGRESS NOTES
Subjective:      Patient ID: Mejia Edwards is a 42 y.o. female. HPI   2 weeks of cold symptoms. Itchy eyes, nasal congestion, cough. Took dayquil, nyquil. Sent to clinic at [facility]. Tessalon perles. Saturday noted rash on chest. Has taken before. No improvement in symptoms. Ears congestion and painful this morning. Taking mucinex D  Review of Systems   Constitutional: Negative for appetite change, chills and fever. HENT: Positive for ear pain, postnasal drip and sinus pressure. Respiratory: Positive for cough. Negative for chest tightness and shortness of breath. Cardiovascular: Negative for chest pain. Gastrointestinal: Negative for constipation and nausea. Neurological: Negative for dizziness and headaches. Psychiatric/Behavioral: Negative. Objective:   Physical Exam   Constitutional: She appears well-developed and well-nourished. No distress. HENT:   Head: Normocephalic and atraumatic. Right Ear: Tympanic membrane and ear canal normal.   Left Ear: Tympanic membrane and ear canal normal.   Nose: Mucosal edema and rhinorrhea present. Mouth/Throat: Uvula is midline and oropharynx is clear and moist.   Neck: Normal range of motion. Neck supple. Cardiovascular: Normal rate and regular rhythm. Pulmonary/Chest: Effort normal and breath sounds normal. No respiratory distress. Abdominal: Soft. Bowel sounds are normal. She exhibits no distension. Musculoskeletal: Normal range of motion. She exhibits no edema. Neurological: She is alert. Skin: Skin is warm. Rash noted. No erythema. Scattered slightly raised red rash chest, abdomen, faintly on back. Skin intact. Psychiatric: She has a normal mood and affect.  Her behavior is normal.     Current Outpatient Prescriptions   Medication Sig Dispense Refill    montelukast (SINGULAIR) 10 MG tablet TAKE 1 TABLET DAILY 90 tablet 2    albuterol sulfate HFA (PROAIR HFA) 108 (90 BASE) MCG/ACT inhaler Inhale 2 puffs into the lungs every 6 hours as needed for Wheezing 1 Inhaler 3    zolpidem (AMBIEN) 5 MG tablet TAKE ONE TABLET BY MOUTH EVERY NIGHT AS NEEDED FOR SLEEP. 30 tablet 2    Multiple Vitamin (ONE-A-DAY ESSENTIAL PO) Take 2 tablets by mouth daily      SYMBICORT 80-4.5 MCG/ACT AERO USE 2 INHALATIONS TWICE A DAY 30.6 g 1    Calcium Citrate-Vitamin D 200-250 MG-UNIT TABS Take 1 tablet by mouth daily      calcium carbonate (OSCAL) 500 MG TABS tablet Take 500 mg by mouth daily      omeprazole (PRILOSEC) 40 MG capsule Take 1 capsule by mouth daily 30 capsule 3    levonorgestrel (MIRENA) 20 MCG/24HR IUD 1 Each by Intrauterine route once.  ondansetron (ZOFRAN) 4 MG tablet Take 1 tablet by mouth every 8 hours as needed for Nausea 20 tablet 0     No current facility-administered medications for this visit. Assessment:      1. Viral respiratory illness  2. Rash  3. cough      Plan:      1. Continue mucinex D    2. Jonathon Carroll was seen today for uri and rash. Diagnoses and all orders for this visit:    Dermatitis  -     methylPREDNISolone (MEDROL, SARAH,) 4 MG tablet; As directed    3.  Increase fluids

## 2017-11-03 ENCOUNTER — OFFICE VISIT (OUTPATIENT)
Dept: BARIATRICS/WEIGHT MGMT | Age: 37
End: 2017-11-03

## 2017-11-03 VITALS
SYSTOLIC BLOOD PRESSURE: 120 MMHG | BODY MASS INDEX: 33.23 KG/M2 | HEIGHT: 61 IN | HEART RATE: 88 BPM | DIASTOLIC BLOOD PRESSURE: 80 MMHG | WEIGHT: 176 LBS

## 2017-11-03 DIAGNOSIS — E66.9 CLASS 1 OBESITY: Primary | ICD-10-CM

## 2017-11-03 DIAGNOSIS — Z71.3 DIETARY COUNSELING AND SURVEILLANCE: ICD-10-CM

## 2017-11-03 PROCEDURE — 99213 OFFICE O/P EST LOW 20 MIN: CPT | Performed by: FAMILY MEDICINE

## 2017-11-03 RX ORDER — METHYLPREDNISOLONE 4 MG/1
4 TABLET ORAL SEE ADMIN INSTRUCTIONS
COMMUNITY
End: 2017-12-15 | Stop reason: ALTCHOICE

## 2017-11-03 ASSESSMENT — ENCOUNTER SYMPTOMS
RESPIRATORY NEGATIVE: 1
GASTROINTESTINAL NEGATIVE: 1
EYES NEGATIVE: 1

## 2017-11-03 NOTE — PATIENT INSTRUCTIONS
changes in your eating habits. Instead of a diet, focus on lifestyle changes that will improve your health and achieve the right balance of energy and calories. To lose weight, you need to burn more calories than you take in. You can do it by eating healthy foods in reasonable amounts and becoming more active, even a little bit every day. Making small changes over time can add up to a lot. Make a plan for change. Many people have found that naming their reasons for change and staying focused on their plan can make a big difference. Work with your doctor to create a plan that is right for you. · Ask yourself: Reva Boone are my personal, most powerful reasons for wanting this change? What will my life look like when I've made the change? \"  · Set your long-term goal. Make it specific, such as \"I will lose x pounds. \"  · Break your long-term goal into smaller, short-term goals. Make these small steps specific and within your reach, things you know you can do. These steps are what keep you going from day to day. How can you stay on your plan for change? Be ready. Choose to start during a time when there are few events that might trigger slip-ups, like holidays, social events, and high-stress periods. Decide on your first few steps. Most people have more success when they make small changes, one step at a time. For example, you might switch a daily candy bar to a piece of fruit, walk 10 minutes more, or add more vegetables to a meal.  Line up your support people. Make sure you're not going to be alone as you make this change. Connect with people who understand how important it is to you. Ask family members and friends for help in keeping with your plan. And think about who could make it harder for you, and how to handle them. Try tracking. People who keep track of what they eat, feel, and do are better at losing weight. Try writing down things like:  · What and how much you eat.   · How you feel before and after each

## 2017-11-03 NOTE — PROGRESS NOTES
Patient: Ni Moreira                      Encounter Date: 11/3/2017    YOB: 1980               Age: 42 y.o. Chief Complaint   Patient presents with    Weight Management     6th MWM, s/p sleeve 2015, 1200 sweetie/ LC meal plan       /80   Pulse 88   Ht 5' 0.5\" (1.537 m)   Wt 176 lb (79.8 kg)   LMP  (LMP Unknown)   BMI 33.81 kg/m²     Body mass index is 33.81 kg/m². Waist Circumference  Waist (Inches): 39.5 in    HPI: 42 y.o. female with a long-standing history of obesity status post sleeve gastrectomy October 2015 presents today for follow-up. Her weight is stable since her last visit in October. She was started on prednisone for a rash last week and has another week of treatment left. It has decreased her appetite. She's been skipping meals. Sometimes only eating 1-2 times/day. Met with the dietitian today. Food recall and assessment reviewed with the patient.      Exercise: [x]Cardio     []Resistance/strength training     []Other: No intentional exercise, but trying to be physically active     Allergies   Allergen Reactions    Codeine Nausea And Vomiting    Elavil [Amitriptyline] Nausea And Vomiting    Tessalon [Benzonatate] Rash         Current Outpatient Prescriptions:     methylPREDNISolone (MEDROL DOSEPACK) 4 MG tablet, Take 4 mg by mouth See Admin Instructions Take by mouth., Disp: , Rfl:     ondansetron (ZOFRAN) 4 MG tablet, Take 1 tablet by mouth every 8 hours as needed for Nausea, Disp: 20 tablet, Rfl: 0    montelukast (SINGULAIR) 10 MG tablet, TAKE 1 TABLET DAILY, Disp: 90 tablet, Rfl: 2    albuterol sulfate HFA (PROAIR HFA) 108 (90 BASE) MCG/ACT inhaler, Inhale 2 puffs into the lungs every 6 hours as needed for Wheezing, Disp: 1 Inhaler, Rfl: 3    zolpidem (AMBIEN) 5 MG tablet, TAKE ONE TABLET BY MOUTH EVERY NIGHT AS NEEDED FOR SLEEP., Disp: 30 tablet, Rfl: 2    Multiple Vitamin (ONE-A-DAY ESSENTIAL PO), Take 2 tablets by mouth daily, Disp: , Rfl:     SYMBICORT Final    Hematocrit 06/21/2017 44.3  36.0 - 48.0 % Final    MCV 06/21/2017 92.2  80.0 - 100.0 fL Final    MCH 06/21/2017 31.0  26.0 - 34.0 pg Final    MCHC 06/21/2017 33.7  31.0 - 36.0 g/dL Final    RDW 06/21/2017 12.4  12.4 - 15.4 % Final    Platelets 04/49/4478 215  135 - 450 K/uL Final    MPV 06/21/2017 10.0  5.0 - 10.5 fL Final    Neutrophils % 06/21/2017 52.8  % Final    Lymphocytes % 06/21/2017 39.6  % Final    Monocytes % 06/21/2017 5.2  % Final    Eosinophils % 06/21/2017 2.0  % Final    Basophils % 06/21/2017 0.4  % Final    Neutrophils # 06/21/2017 4.1  1.7 - 7.7 K/uL Final    Lymphocytes # 06/21/2017 3.1  1.0 - 5.1 K/uL Final    Monocytes # 06/21/2017 0.4  0.0 - 1.3 K/uL Final    Eosinophils # 06/21/2017 0.2  0.0 - 0.6 K/uL Final    Basophils # 06/21/2017 0.0  0.0 - 0.2 K/uL Final    Sodium 06/21/2017 141  136 - 145 mmol/L Final    Potassium 06/21/2017 4.5  3.5 - 5.1 mmol/L Final    Chloride 06/21/2017 100  99 - 110 mmol/L Final    CO2 06/21/2017 23  21 - 32 mmol/L Final    Anion Gap 06/21/2017 18* 3 - 16 Final    Glucose 06/21/2017 115* 70 - 99 mg/dL Final    BUN 06/21/2017 12  7 - 20 mg/dL Final    CREATININE 06/21/2017 0.5* 0.6 - 1.1 mg/dL Final    GFR Non- 06/21/2017 >60  >60 Final    Comment: >60 mL/min/1.73m2 EGFR, calc. for ages 25 and older using the  MDRD formula (not corrected for weight), is valid for stable  renal function.  GFR  06/21/2017 >60  >60 Final    Comment: Chronic Kidney Disease: less than 60 ml/min/1.73 sq.m. Kidney Failure: less than 15 ml/min/1.73 sq.m. Results valid for patients 18 years and older.       Calcium 06/21/2017 9.6  8.3 - 10.6 mg/dL Final    Total Protein 06/21/2017 7.4  6.4 - 8.2 g/dL Final    Alb 06/21/2017 4.6  3.4 - 5.0 g/dL Final    Albumin/Globulin Ratio 06/21/2017 1.6  1.1 - 2.2 Final    Total Bilirubin 06/21/2017 0.7  0.0 - 1.0 mg/dL Final    Alkaline Phosphatase 06/21/2017 69  40 - 129 U/L Final    ALT 06/21/2017 12  10 - 40 U/L Final    AST 06/21/2017 13* 15 - 37 U/L Final    Globulin 06/21/2017 2.8  g/dL Final    Hemoglobin A1C 06/21/2017 5.5  See comment % Final    Comment: Comment:  Diagnosis of Diabetes: > or = 6.5%  Increased risk of diabetes (Prediabetes): 5.7-6.4%  Glycemic Control: Nonpregnant Adults: <7.0%                    Pregnant: <6.0%        eAG 06/21/2017 111.2  mg/dL Final    Vitamin B-12 06/21/2017 487  211 - 911 pg/mL Final    Folate 06/21/2017 >20.00  4.78 - 24.20 ng/mL Final    Comment: Effective 11-15-16 10:00am EST  Please note reference ranges have  changed for Folate.  Vit D, 25-Hydroxy 06/21/2017 38.3  >=30 ng/mL Final    Comment: <=20 ng/mL. ........... [place] Old Deficient  21-29 ng/mL. ......... Harles Old Insufficient  >=30 ng/mL. ........ Harles Old Sufficient      Cholesterol, Total 06/21/2017 172  0 - 199 mg/dL Final    Triglycerides 06/21/2017 98  0 - 150 mg/dL Final    HDL 06/21/2017 51  40 - 60 mg/dL Final    LDL Calculated 06/21/2017 101* <100 mg/dL Final    VLDL CHOLESTEROL CALCULATED 06/21/2017 20  Not Established mg/dL Final    Iron 06/21/2017 175* 37 - 145 ug/dL Final    TIBC 06/21/2017 296  260 - 445 ug/dL Final    Iron Saturation 06/21/2017 59* 15 - 50 % Final    Ferritin 06/21/2017 51.4  15.0 - 150.0 ng/mL Final    TSH 06/21/2017 1.11  0.27 - 4.20 uIU/mL Final         Assessment and Plan:    ICD-10-CM ICD-9-CM    1. Class 1 obesity E66.9 278.00 Stable. Encouraged patient to try to follow the recommended 1200-Nigel/low carb meal plan as best as she can and to try to eat 3 roberth a day even if she doesn't have much of an appetite. Continue exercise. F/u in 4 weeks. 2. BMI 33.0-33.9,adult Z68.33 V85.33    3. Dietary counseling and surveillance Z71.3 V65.3 Greater than 50% of this 15 minute visit was used in direct counseling.        Nutrition plan: [] LCHF/Ketogenic   [x] Modified low-calorie diet (low carb/low-nigel)               [] Low-calorie diet

## 2017-11-06 ENCOUNTER — OFFICE VISIT (OUTPATIENT)
Dept: URGENT CARE | Age: 37
End: 2017-11-06

## 2017-11-06 VITALS
OXYGEN SATURATION: 99 % | DIASTOLIC BLOOD PRESSURE: 81 MMHG | TEMPERATURE: 97.9 F | SYSTOLIC BLOOD PRESSURE: 130 MMHG | BODY MASS INDEX: 33.77 KG/M2 | WEIGHT: 172 LBS | HEART RATE: 85 BPM | HEIGHT: 60 IN

## 2017-11-06 DIAGNOSIS — H53.8 BLURRED VISION, BILATERAL: Primary | ICD-10-CM

## 2017-11-06 PROCEDURE — 99212 OFFICE O/P EST SF 10 MIN: CPT | Performed by: PHYSICIAN ASSISTANT

## 2017-11-06 RX ORDER — PREDNISONE 20 MG/1
20 TABLET ORAL DAILY
COMMUNITY
End: 2017-12-15 | Stop reason: ALTCHOICE

## 2017-11-06 ASSESSMENT — ENCOUNTER SYMPTOMS
PHOTOPHOBIA: 0
EYE REDNESS: 0
EYE DISCHARGE: 0
SORE THROAT: 0
NAUSEA: 0
DOUBLE VISION: 0
EYE PAIN: 0
VOMITING: 0
COUGH: 0
BLURRED VISION: 1
SINUS PAIN: 1
STRIDOR: 0

## 2017-11-06 NOTE — PROGRESS NOTES
Reason for Visit:   Chief Complaint   Patient presents with    Blurred Vision     Onset:  17;  new onset blurred vision, mild headache (pressure)     Patient History     HPI: Alyssa Barnett is a 42 y.o. female who presents with acute onset of blurred vision x 2 days. She reports vision is blurred only with distance vision and then only with fine print. She can see well with near vision and reports seeing most things at a distance. The blurring is new and equal in both eyes. She reports a mild headache at mid-face for the past two days as well. She denies any light sensitivity, eye pain, redness, discharge, or pressure. She denies any nasal congestion, recent URI, N/V, tinnitus, or vertigo. She has been on prednisone for the past two weeks. She reports having a pruritic rash, first treated with medrol dosepack and then when rash persisted she was placed on higher dose (20mg) with 9 day taper. She has 4 days left of this taper. She denies prior hx of headaches or occular migraines. She sees an ophthalmologist annually and she had 20/20 vision at her last annual checkup. She reports having Lasik done in  and she has a hx of DM2 and obesity, which has since resolved after having gastric surgery. Past Medical History:   Diagnosis Date    Asthma     Depression     ONLY POSTPARTEM    Hyperlipidemia 3/8/2013    Obesity     Type II or unspecified type diabetes mellitus without mention of complication, not stated as uncontrolled        Past Surgical History:   Procedure Laterality Date    BREAST SURGERY      MILK DUCT     SECTION  , 3/04,     LASIK      SLEEVE GASTRECTOMY  10/21/15    laproscopic    TONSILLECTOMY      UPPER GASTROINTESTINAL ENDOSCOPY  8/19/15       Allergies:    Allergies   Allergen Reactions    Codeine Nausea And Vomiting    Elavil [Amitriptyline] Nausea And Vomiting    Tessalon [Benzonatate] Rash Review of Systems     ROS: Please refer to HPI for any pertinent positive findings, as all other systems were reviewed as negative unless otherwise listed above. Review of Systems   Constitutional: Negative for chills, diaphoresis, fever and malaise/fatigue. HENT: Positive for sinus pain. Negative for congestion, hearing loss, sore throat and tinnitus. Eyes: Positive for blurred vision. Negative for double vision, photophobia, pain, discharge and redness. Respiratory: Negative for cough and stridor. Gastrointestinal: Negative for nausea and vomiting. Skin: Negative for rash. Neurological: Positive for headaches. Negative for dizziness. Endo/Heme/Allergies: Negative for environmental allergies. Physical Exam     Vitals:    11/06/17 1330   BP: 130/81   Pulse: 85   Temp: 97.9 °F (36.6 °C)   SpO2: 99%       Constitutional:  Well developed, well nourished, no acute distress, non-toxic appearance   Eyes:  PERRL, conjunctiva normal, no ciliary injection, evidence of foreign body, or discharge. HENT:  Head is normocephalic, atraumatic; external ears and TMs normal bilaterally, external nose and nasal mucosa normal, oropharynx pink and moist, no pharyngeal exudates. Neck- Supple, passive and active range of motion in tact, no tenderness upon palpation along spine. No LAD  or neck masses appreciated. Respiratory:  No respiratory distress, normal breath sounds bilaterally, no rales, no wheezing. Cardiovascular:  Normal rate, normal rhythm, no murmurs, no gallops, no rubs   GI:  Abdomen soft, nontender, nondistended, normal bowel sounds, no organomegaly, no mass, no rebound, no guarding. Musculoskeletal:  No pain upon palpation along spine or musculature. No edema, no tenderness, no deformities. Integument:  Well hydrated, no lesions, cyanosis, or rashes appreciated.   Lymphatic:  No lymphadenopathy noted   Neurologic:  Alert & oriented x 3, CN 2-12 in tact bilaterally, normal motor function and sensation in tact, no focal deficits noted   Psychiatric:  Speech and behavior appropriate. Appropriate mood and affect. Physical Exam    Procedure:   none    Assessment:    1. Blurred vision, bilateral        Plan:    - We have discussed blurred vision may be a possible cause for spontaneous blurred vision. I have suggested she curb the taper today and wean in two days. If blurred vision persists/worsens, and/or other sxs develop (eye pain, redness, vision changes discussed) to follow up with ophthalmologist in the next 3-5 days for further evaluation. No orders of the defined types were placed in this encounter.

## 2017-11-09 ENCOUNTER — PATIENT MESSAGE (OUTPATIENT)
Dept: FAMILY MEDICINE CLINIC | Age: 37
End: 2017-11-09

## 2017-11-09 DIAGNOSIS — H53.8 BLURRED VISION: Primary | ICD-10-CM

## 2017-12-11 ENCOUNTER — OFFICE VISIT (OUTPATIENT)
Dept: BARIATRICS/WEIGHT MGMT | Age: 37
End: 2017-12-11

## 2017-12-11 VITALS
SYSTOLIC BLOOD PRESSURE: 118 MMHG | HEART RATE: 68 BPM | HEIGHT: 61 IN | DIASTOLIC BLOOD PRESSURE: 70 MMHG | BODY MASS INDEX: 33.51 KG/M2 | WEIGHT: 177.5 LBS

## 2017-12-11 DIAGNOSIS — E66.9 CLASS 1 OBESITY: Primary | ICD-10-CM

## 2017-12-11 DIAGNOSIS — Z98.84 S/P LAPAROSCOPIC SLEEVE GASTRECTOMY: ICD-10-CM

## 2017-12-11 DIAGNOSIS — Z71.3 DIETARY COUNSELING AND SURVEILLANCE: ICD-10-CM

## 2017-12-11 PROCEDURE — 99213 OFFICE O/P EST LOW 20 MIN: CPT | Performed by: FAMILY MEDICINE

## 2017-12-11 RX ORDER — ERGOCALCIFEROL 1.25 MG/1
CAPSULE ORAL
COMMUNITY
Start: 2017-11-13 | End: 2018-04-30 | Stop reason: SDUPTHER

## 2017-12-11 ASSESSMENT — ENCOUNTER SYMPTOMS
GASTROINTESTINAL NEGATIVE: 1
EYES NEGATIVE: 1
RESPIRATORY NEGATIVE: 1

## 2017-12-11 NOTE — PROGRESS NOTES
Elvira Moreira gained 1.5 lbs over 1 month. Pt reports losing her job and is not motivated to do anything - states \"I feel like I need a happy pill. \"  Has plan to see pcp later this week. Current treatment plan:   Patient is not on medication. Negative side effects from medications? no  Patient is not on Optifast.   Patient is  on diet and exercise only plan. Treatment plan details: 1200 calorie, low carb    Is patient adhering to diet/meal plan: Makes good choices but not eating consistently    Carbohydrate consumption: not tracking    Breakfast: 1-2 Eggs + 2-3 turkey sausage links    Lunch: Salad kit w/ seeds + vinaigrette dressing    Dinner: Salad kit w/ seeds vinaigrette dressing OR 3-4 oz grilled salmon + potato soup    Taking 2 generic MVI/ day    Consuming at least 64oz of calorie free fluids? Yes - unsweet tea    Participating in intentional exercise?  Yes Details: at least 2/week running + HIIT classes    Plan/Goals:   - Consistently eat 3-4 small, protein based meals and snacks/day  - Start tracking   - Attend support group    Handouts: none    [employer]

## 2017-12-11 NOTE — PROGRESS NOTES
hours as needed for Nausea, Disp: 20 tablet, Rfl: 0    montelukast (SINGULAIR) 10 MG tablet, TAKE 1 TABLET DAILY, Disp: 90 tablet, Rfl: 2    albuterol sulfate HFA (PROAIR HFA) 108 (90 BASE) MCG/ACT inhaler, Inhale 2 puffs into the lungs every 6 hours as needed for Wheezing, Disp: 1 Inhaler, Rfl: 3    zolpidem (AMBIEN) 5 MG tablet, TAKE ONE TABLET BY MOUTH EVERY NIGHT AS NEEDED FOR SLEEP., Disp: 30 tablet, Rfl: 2    Multiple Vitamin (ONE-A-DAY ESSENTIAL PO), Take 2 tablets by mouth daily, Disp: , Rfl:     SYMBICORT 80-4.5 MCG/ACT AERO, USE 2 INHALATIONS TWICE A DAY, Disp: 30.6 g, Rfl: 1    Calcium Citrate-Vitamin D 200-250 MG-UNIT TABS, Take 1 tablet by mouth daily, Disp: , Rfl:     calcium carbonate (OSCAL) 500 MG TABS tablet, Take 500 mg by mouth daily, Disp: , Rfl:     omeprazole (PRILOSEC) 40 MG capsule, Take 1 capsule by mouth daily, Disp: 30 capsule, Rfl: 3    levonorgestrel (MIRENA) 20 MCG/24HR IUD, 1 Each by Intrauterine route once., Disp: , Rfl:     Patient Active Problem List   Diagnosis    Diabetes mellitus type 2 in obese (HCC)    Asthma    Depression    Vitamin D deficiency    Morbid obesity (HCC)    Hyperlipidemia    Hip flexor tendinitis    Right upper quadrant abdominal pain    Nausea    Hypertriglyceridemia    S/P laparoscopic sleeve gastrectomy    Shoulder impingement    Obesity (BMI 30.0-34. 9)       Review of Systems   HENT: Negative. Eyes: Negative. Respiratory: Negative. Cardiovascular: Negative. Gastrointestinal: Negative. Endocrine: Negative. Musculoskeletal: Negative. Neurological: Negative. Psychiatric/Behavioral: Negative. Physical Exam   Constitutional: She is oriented to person, place, and time. She appears well-developed and well-nourished. Neurological: She is alert and oriented to person, place, and time. Skin: Skin is warm and dry. Psychiatric: She has a normal mood and affect.  Her behavior is normal. Judgment and thought content normal.   Tearful       Orders Only on 2017   Component Date Value Ref Range Status    OD Visual Acuity Distance 2017 20/20   Final    Visual Acuity Distance Eye 2017 20/20   Final    Intraocular Pressure Eye 2017    Final                    Value:10  11      Diabetic Retinopathy 2017 NEGATIVE   Final    Cataracts 2017 NEGATIVE   Final    Glaucoma 2017 NEGATIVE   Final         Assessment and Plan:    ICD-10-CM ICD-9-CM    1. Class 1 obesity E66.9 278.00 Stable. Once again counseled on the importance of therapeutic lifestyle changes. Focus on weight maintenance for now until ready to start focusing again on active weight loss. F/u in 3 months or sooner as needed. Has an appointment with Dr. Rita Diamond on 12/15 to discuss  depression. 2. BMI 34.0-34.9,adult Z68.34 V85.34    3. Dietary counseling and surveillance Z71.3 V65.3 Greater than 50% of this 20 minute visit was used in direct counseling. 4. S/P laparoscopic sleeve gastrectomy Z98.84 V45.86 Avoid all carbonated drinks. Choose fluids that are sugar-free. Eat small, but frequent meals including a protein source with each meal.  Eat meals over 30 minutes, taking small bites and chewing well. Take MVIs as directed.         Nutrition plan: [] LCHF/Ketogenic   [x] Modified low-calorie diet (low carb/low-sweetie)               [] Low-calorie diet    []Maintenance       []Other    Exercise: []Cardio     []Resistance/strength training                       [x]ACSM recommendations (150 minutes/week in active weight loss)                              Behavior: [x]Motivational interviewing performed            []Referral for counseling                         []Discussed strategies to overcome habits/challenges for focus         [x] Stress management   [x] Stimulus control                    [] Sleep hygiene    Reviewed:  [x] Nutrition and the importance of regular protein intake  [x] Hidden carbohydrate sources  [x] Alcohol use  [x] Tobacco use   [x] Importance of exercise and reducing sedentary time       No orders of the defined types were placed in this encounter. No Follow-up on file. This dictation was performed with a verbal recognition program (Dragon) and all efforts were made to ensure accuracy of this dictation. It is possible that there are still dictated errors within this note. If so, please bring any errors to my attention for correction.

## 2017-12-11 NOTE — PATIENT INSTRUCTIONS
Patient Education        Learning About Obesity  What is obesity? Obesity means having so much body fat that your health is in danger. Having too much body fat can lead to type 2 diabetes, heart disease, high blood pressure, arthritis, sleep apnea, and stroke. Even if you don't feel bad now, think about these health risks. Do they seem like a good reason to start on a new path toward a healthier weight? Or do you have another personal, powerful reason for wanting to lose weight? Whatever it is, keep it in mind. It can be hard to change eating habits and exercise habits. But with your own reason and plan, you can do it. How do you know if your weight is in the obesity range? To know if your weight is in the obesity range, your doctor looks at your body mass index (BMI) and waist size. Your BMI is a number that is calculated from your weight and your height. To figure your BMI for yourself, get a BMI table from your doctor or use an online tool, such as http://www.bruno.com/ on the [employer]. What causes obesity? When you take in more calories than you burn off, you gain weight. How you eat, how active you are, and other things affect how your body uses calories and whether you gain weight. If you have family members who have too much body fat, you may have inherited a tendency to gain weight. And your family also helps form your eating and lifestyle habits, which can lead to obesity. Also, our busy lives make it harder to plan and cook healthy meals. For many of us, it's easier to reach for prepared foods, go out to eat, or go to the drive-through. But these foods are often high in saturated fat and calories. Portions are often too large. What can you do to reach a healthy weight? Focus on health, not diets. Diets are hard to stay on and don't work in the long run.  It is very hard to stay with a diet that includes lots of big meal.  · Details about each meal (like eating out or at home, eating alone, or with friends or family). · What you do to be active. Look and plan. As you track, look for patterns that you may want to change. Take note of:  · When you eat and whether you skip meals. · How often you eat out. · How many fruits and vegetables you eat. · When you eat beyond feeling full. · When and why you eat for reasons other than being hungry. When you stray from your plan, don't get upset. Figure out what made you slip up and how you can fix it. Can you take medicines or have surgery to lose weight? Before your doctor will prescribe medicines or surgery, he or she will probably want you to be more active and follow your healthy eating plan for a period of time. These habits are key lifelong changes for managing your weight, with or without other medical treatment. And these changes can help you avoid weight-related health problems. Follow-up care is a key part of your treatment and safety. Be sure to make and go to all appointments, and call your doctor if you are having problems. It's also a good idea to know your test results and keep a list of the medicines you take. Where can you learn more? Go to https://Research & InnovationpeGuvera.Sjapper. org and sign in to your Enubila account. Enter N111 in the Talaentia box to learn more about \"Learning About Obesity. \"     If you do not have an account, please click on the \"Sign Up Now\" link. Current as of: October 13, 2016  Content Version: 11.4  © 2959-9601 Healthwise, Incorporated. Care instructions adapted under license by [facility] ([facility]). If you have questions about a medical condition or this instruction, always ask your healthcare professional. [place] [address] any warranty or liability for your use of this information.      Plan/Goals:   - Consistently eat 3-4 small, protein based meals and snacks/day  - Start tracking   - Attend support group

## 2017-12-15 ENCOUNTER — OFFICE VISIT (OUTPATIENT)
Dept: FAMILY MEDICINE CLINIC | Age: 37
End: 2017-12-15

## 2017-12-15 VITALS
OXYGEN SATURATION: 98 % | WEIGHT: 177 LBS | DIASTOLIC BLOOD PRESSURE: 70 MMHG | HEART RATE: 74 BPM | HEIGHT: 61 IN | BODY MASS INDEX: 33.42 KG/M2 | SYSTOLIC BLOOD PRESSURE: 106 MMHG

## 2017-12-15 DIAGNOSIS — H53.8 BLURRED VISION: ICD-10-CM

## 2017-12-15 DIAGNOSIS — H53.8 BLURRED VISION, BILATERAL: Primary | ICD-10-CM

## 2017-12-15 LAB
A/G RATIO: 1.6 (ref 1.1–2.2)
ALBUMIN SERPL-MCNC: 4.4 G/DL (ref 3.4–5)
ALP BLD-CCNC: 67 U/L (ref 40–129)
ALT SERPL-CCNC: 14 U/L (ref 10–40)
ANION GAP SERPL CALCULATED.3IONS-SCNC: 14 MMOL/L (ref 3–16)
AST SERPL-CCNC: 13 U/L (ref 15–37)
BASOPHILS ABSOLUTE: 0 K/UL (ref 0–0.2)
BASOPHILS RELATIVE PERCENT: 0.5 %
BILIRUB SERPL-MCNC: 0.4 MG/DL (ref 0–1)
BUN BLDV-MCNC: 10 MG/DL (ref 7–20)
CALCIUM SERPL-MCNC: 9.4 MG/DL (ref 8.3–10.6)
CHLORIDE BLD-SCNC: 103 MMOL/L (ref 99–110)
CO2: 25 MMOL/L (ref 21–32)
CREAT SERPL-MCNC: <0.5 MG/DL (ref 0.6–1.1)
EOSINOPHILS ABSOLUTE: 0.2 K/UL (ref 0–0.6)
EOSINOPHILS RELATIVE PERCENT: 1.9 %
GFR AFRICAN AMERICAN: >60
GFR NON-AFRICAN AMERICAN: >60
GLOBULIN: 2.7 G/DL
GLUCOSE BLD-MCNC: 140 MG/DL (ref 70–99)
HCT VFR BLD CALC: 42.6 % (ref 36–48)
HEMOGLOBIN: 14.9 G/DL (ref 12–16)
LYMPHOCYTES ABSOLUTE: 3.2 K/UL (ref 1–5.1)
LYMPHOCYTES RELATIVE PERCENT: 40.1 %
MCH RBC QN AUTO: 32.2 PG (ref 26–34)
MCHC RBC AUTO-ENTMCNC: 35.1 G/DL (ref 31–36)
MCV RBC AUTO: 91.6 FL (ref 80–100)
MONOCYTES ABSOLUTE: 0.5 K/UL (ref 0–1.3)
MONOCYTES RELATIVE PERCENT: 5.8 %
NEUTROPHILS ABSOLUTE: 4.2 K/UL (ref 1.7–7.7)
NEUTROPHILS RELATIVE PERCENT: 51.7 %
PDW BLD-RTO: 12.8 % (ref 12.4–15.4)
PLATELET # BLD: 215 K/UL (ref 135–450)
PLATELET SLIDE REVIEW: ADEQUATE
PMV BLD AUTO: 9.4 FL (ref 5–10.5)
POTASSIUM SERPL-SCNC: 4.6 MMOL/L (ref 3.5–5.1)
RBC # BLD: 4.65 M/UL (ref 4–5.2)
SLIDE REVIEW: NORMAL
SODIUM BLD-SCNC: 142 MMOL/L (ref 136–145)
TOTAL PROTEIN: 7.1 G/DL (ref 6.4–8.2)
TSH REFLEX: 1.03 UIU/ML (ref 0.27–4.2)
VITAMIN B-12: 441 PG/ML (ref 211–911)
WBC # BLD: 8.1 K/UL (ref 4–11)

## 2017-12-15 PROCEDURE — 99213 OFFICE O/P EST LOW 20 MIN: CPT | Performed by: FAMILY MEDICINE

## 2017-12-15 NOTE — PATIENT INSTRUCTIONS
Patient Education        Reduced Vision: Care Instructions  Your Care Instructions    Reduced vision can be caused by many things. These include macular degeneration and glaucoma. When you can't see as well, daily life can be more challenging. But you can do some things to stay independent and keep doing the activities you enjoy. Follow-up care is a key part of your treatment and safety. Be sure to make and go to all appointments, and call your doctor if you are having problems. It's also a good idea to know your test results and keep a list of the medicines you take. How can you care for yourself at home? Use lighting  · Point lighting at what you want to see. Don't point it at your eyes. · Add lamps where you need extra lighting. · Use curtains or shades to adjust how much natural light there is. · Use good lighting in places where you could easily fall. These include entries and stairways. Use labels  · Label things that are hard to recognize or that could be confusing. This might include medicines, spices, and foods. Use black letters on a white background. Or you can color-code the items. · Kisha Juan the positions of the temperature settings you use the most on your stove and oven. Also gabriel the \"on\" and \"off\" positions. · Gabriel the water temperatures you use on faucets in the kitchen and bathroom. To prevent overfilling a sink or bathtub, use waterproof markers or tape to gabriel the water level you want. Avoid falls in your home  · Replace or remove any worn carpeting. Tape down or remove area rugs. · Do not wax your floors. Use nonskid, nonglare  on smooth floors. · Remove electrical cords from areas where you need to walk. Or tape them down so you won't trip on them. · Make sure furniture doesn't stick out into areas where you walk. Keep chairs pushed in under tables and desks. Keep all drawers closed. · Keep doors fully opened or fully closed. Don't leave them FPC open or shut.   · Use handrails on stairways and ramps. Make sure that they go beyond the top and bottom steps. Then you won't stumble if you miss a step. Use helpful technology  · Use a magnifying lens. You can buy ones that you hold. Or you can buy ones that attach to glasses. Some have lights built in.  · If your budget allows, you may want to think about a video magnifier system. These systems can make print, pictures, or other items bigger on a screen. · If you have a computer:  ¨ Try to adjust the display. You can often change how big the text and pictures appear. Then they will be easier to see and read. ¨ You may want to try special software. Some software can recognize spoken commands or change dictated speech into text. Other software allows computers to speak text and read documents. · Use large-print items. These include books, newspapers, magazines, and medicine labels. You can also listen to recordings of books. · Think about using devices made for people with low vision. Examples are clocks and watches that announce the time. There are also clocks, telephones, and calculators with extra-large buttons. Be safe while you stay active  · Ask your doctor what physical activities are safe for you. If you bend, lift things, or move fast, it may affect your health or vision. · Ask a friend to read you the instructions for a new exercise and to check your technique. · Walk with someone who can help look for things that may be a danger. · If you swim laps, use a pool that has ropes between the lanes. When should you call for help? Watch closely for changes in your health, and be sure to contact your doctor if:  ? · You have vision changes. Where can you learn more? Go to https://One Mojajewels.GoSpotCheck. org and sign in to your Social Strategy 1 account. Enter V357 in the Nitronex box to learn more about \"Reduced Vision: Care Instructions. \"     If you do not have an account, please click on the \"Sign Up Now\"

## 2017-12-16 LAB
ESTIMATED AVERAGE GLUCOSE: 125.5 MG/DL
HBA1C MFR BLD: 6 %

## 2017-12-16 RX ORDER — ALBUTEROL SULFATE 90 UG/1
2 AEROSOL, METERED RESPIRATORY (INHALATION) EVERY 6 HOURS PRN
Qty: 1 INHALER | Refills: 3 | Status: SHIPPED | OUTPATIENT
Start: 2017-12-16 | End: 2018-01-25 | Stop reason: SDUPTHER

## 2017-12-16 ASSESSMENT — ENCOUNTER SYMPTOMS
BLURRED VISION: 1
DOUBLE VISION: 0
EYE DISCHARGE: 0
FOREIGN BODY SENSATION: 0

## 2017-12-16 NOTE — PROGRESS NOTES
reviewed. Assessment:      1. Blurred vision, bilateral           Plan:      Alejandro Ziegler was seen today for blurred vision. Diagnoses and all orders for this visit:    Blurred vision, bilateral  Labs previously ordered, including hemoglobin A1C, vit b12, tsh, cmp, cbc. I think this is likely from prednisone, as she was taking it when the blurriness occurred. This has since improved off prednisone. We will ensure labs are normal, but likely vision will return to normal given recent normal eye exam.     Other orders  -     albuterol sulfate HFA (PROAIR HFA) 108 (90 Base) MCG/ACT inhaler;  Inhale 2 puffs into the lungs every 6 hours as needed for Wheezing

## 2017-12-20 ENCOUNTER — OFFICE VISIT (OUTPATIENT)
Dept: ENDOCRINOLOGY | Age: 37
End: 2017-12-20

## 2017-12-20 VITALS
HEIGHT: 61 IN | BODY MASS INDEX: 33.76 KG/M2 | OXYGEN SATURATION: 95 % | DIASTOLIC BLOOD PRESSURE: 78 MMHG | SYSTOLIC BLOOD PRESSURE: 107 MMHG | WEIGHT: 178.8 LBS | HEART RATE: 80 BPM

## 2017-12-20 DIAGNOSIS — E08.00 DIABETES MELLITUS DUE TO UNDERLYING CONDITION WITH HYPEROSMOLARITY WITHOUT COMA, WITHOUT LONG-TERM CURRENT USE OF INSULIN (HCC): ICD-10-CM

## 2017-12-20 DIAGNOSIS — E66.9 OBESITY (BMI 30.0-34.9): ICD-10-CM

## 2017-12-20 DIAGNOSIS — E78.2 MIXED HYPERLIPIDEMIA: ICD-10-CM

## 2017-12-20 DIAGNOSIS — E66.9 DIABETES MELLITUS TYPE 2 IN OBESE (HCC): Primary | ICD-10-CM

## 2017-12-20 DIAGNOSIS — E55.9 VITAMIN D DEFICIENCY: ICD-10-CM

## 2017-12-20 DIAGNOSIS — E11.69 DIABETES MELLITUS TYPE 2 IN OBESE (HCC): Primary | ICD-10-CM

## 2017-12-20 PROCEDURE — 99214 OFFICE O/P EST MOD 30 MIN: CPT | Performed by: NURSE PRACTITIONER

## 2017-12-20 ASSESSMENT — ENCOUNTER SYMPTOMS
SHORTNESS OF BREATH: 0
COLOR CHANGE: 0
CONSTIPATION: 0
EYE PAIN: 0
NAUSEA: 0
DIARRHEA: 0

## 2017-12-20 ASSESSMENT — PATIENT HEALTH QUESTIONNAIRE - PHQ9
1. LITTLE INTEREST OR PLEASURE IN DOING THINGS: 0
2. FEELING DOWN, DEPRESSED OR HOPELESS: 1
SUM OF ALL RESPONSES TO PHQ QUESTIONS 1-9: 1
SUM OF ALL RESPONSES TO PHQ9 QUESTIONS 1 & 2: 1

## 2017-12-20 NOTE — ASSESSMENT & PLAN NOTE
Prediabetic A1c likely due to prolonged prednisone use  Recheck in 3 months  Monitor diet and exercise

## 2017-12-20 NOTE — PROGRESS NOTES
Endocrinology  [place], [place]  Phone: [phone]   FAX: [phone]    Denia Yanez is a 42 y.o. female  with a history of Diabetes Mellitus Type 2 for over 16 years  HPI  Diabetes:  Tanisha Moreira  has been diabetic since 2001 and reports disease course has been stable. She is not currently on any medication s/p gastric sleeve surgery. She reports no new concerns other than not being able to lose an additional 20 lbs. Follows with Dr Tatiana Ku. A1c currently back in prediabetic range after being on prednisone for about a month for a severe cold and a persistent rash. Finished the prednisone protocol early November. Past history: Her A1c was 5.9 % in 07/13 and was started on metformin 500 mg BID. Given poor compliance with twice daily dosing , she was put on metformin  mg 2 pills daily ( total dose of 1500 mg daily), Invokana 300 mg daily and lantus insulin 44 units qhs. She stopped her insulin and medications after gastric sleeve in 10/15. Patient uses a glucometer: rarely  Patient brought glucometer for download no  Hypoglycemia awareness and symptoms: none currently    A1C trends   Lab Results   Component Value Date    LABA1C 6.0 12/15/2017    LABA1C 5.5 06/20/2017    LABA1C 5.1 09/16/2016     Lab Results   Component Value Date    LABMICR YES 11/16/2016    LDLCALC 101 (H) 06/20/2017    CREATININE <0.5 (L) 12/15/2017     Lab Results   Component Value Date    .5 12/15/2017    .2 06/20/2017    EAG 99.7 09/16/2016     Current Medication regimen:   No oral or injectable hypoglycemic medication at this time    Complications:  · Neuropathy denies tingling or numbness in extremeties  · Nephropathy labs are WNL  · Retinopathy recent eye check with no concerns    Diabetic Health Maintenance   · Last Eye Exam: 11/2017 @ [facility], blurry vision with prednisone, everything was fine per pt, and vision is now improved. · Last Foot exam: in the past for foot injury  · Has patient seen a dietitian? Yes  · Current Exercise: tries to run about 3-4 times a week  · On ACEI or ARB: not on medication   · On statin: not on a statin    Risk Factors  · Smoker: never smoker  · ETOH: negligible    Vitamin D deficiency: Currently is on 50 K weekly. Lab Results   Component Value Date    VITD25 38.3 06/20/2017    VITD25 88.5 09/16/2016    VITD25 >120.0 03/11/2016     Hyperlipidemia: Currently is on no statin. Current complaints include  Lab Results   Component Value Date    CHOL 172 06/20/2017    CHOL 163 09/16/2016    CHOL 167 03/11/2016     Lab Results   Component Value Date    TRIG 98 06/20/2017    TRIG 78 09/16/2016    TRIG 121 03/11/2016     Lab Results   Component Value Date    HDL 51 06/20/2017    HDL 52 09/16/2016    HDL 42 03/11/2016    HDL 48 11/04/2011    HDL 35 05/24/2010     Lab Results   Component Value Date    LDLCALC 101 06/20/2017    [address] 09/16/2016    LDLCALC 101 03/11/2016     Lab Results   Component Value Date    LDLDIRECT 90 12/31/2014    LDLDIRECT 95 07/17/2013    LDLDIRECT 52 01/10/2013     No results found for: CHOLHDLRATIO    Weight gain/Obesity  Patient reports concern at not being able to lose the 20 lbs plus has gained weight with prednisone. BMI 34.34    Past Medical History:   Diagnosis Date    Asthma     Depression     ONLY POSTPARTEM    Hyperlipidemia 3/8/2013    Obesity     Type II or unspecified type diabetes mellitus without mention of complication, not stated as uncontrolled      Family History   Problem Relation Age of Onset    High Blood Pressure Mother     High Cholesterol Father     Diabetes Father     Cancer Father      melanoma, shoulder    Stroke Maternal Grandmother     Diabetes Maternal Grandmother     Vision Loss Maternal Grandmother     Diabetes Paternal Grandfather     Arthritis Paternal Grandfather      Review of Systems   Constitutional: Negative for activity change, appetite change, diaphoresis, fever and unexpected weight change.    HENT: Negative for upon visual examination. No deformity is noted. Sensory: 10 g monofilament is 10/10 on the right and 10/10 on the left, 128 Hz vibration sense is present bilaterally. Olive Vides is a 42year old female with a history of DMT2, hyperlipidemia and obesity. She is currently on no medications for the above s/p gastric sleeve surgery in 2015. Plan  Problem List Items Addressed This Visit     Diabetes mellitus type 2 in obese ([address] - Primary     Prediabetic A1c likely due to prolonged prednisone use  Recheck in 3 months  Monitor diet and exercise         Vitamin D deficiency     Levels in ref range  Continue with supplementation         Hyperlipidemia     LDL elevated, not on a statin, watches fat intake         Relevant Orders    Lipid Panel    Obesity (BMI 30.0-34. 9)     Reviewed dietary concerns  Recommended carb counting resource  Not interested in medically supported weight loss at this time           Other Visit Diagnoses     Diabetes mellitus due to underlying condition with hyperosmolarity without coma, without long-term current use of insulin (HCC)        Relevant Orders    Hemoglobin A1C    Comprehensive Metabolic Panel        Return in about 6 months (around 6/20/2018).

## 2017-12-28 ENCOUNTER — TELEPHONE (OUTPATIENT)
Dept: BARIATRICS/WEIGHT MGMT | Age: 37
End: 2017-12-28

## 2017-12-28 DIAGNOSIS — K21.9 CHRONIC GERD: Primary | ICD-10-CM

## 2017-12-28 RX ORDER — OMEPRAZOLE 40 MG/1
40 CAPSULE, DELAYED RELEASE ORAL DAILY
Qty: 90 CAPSULE | Refills: 0 | Status: SHIPPED | OUTPATIENT
Start: 2017-12-28 | End: 2018-06-29 | Stop reason: SDUPTHER

## 2018-01-26 RX ORDER — ALBUTEROL SULFATE 90 UG/1
2 AEROSOL, METERED RESPIRATORY (INHALATION) EVERY 6 HOURS PRN
Qty: 3 INHALER | Refills: 3 | Status: SHIPPED | OUTPATIENT
Start: 2018-01-26 | End: 2021-08-01 | Stop reason: SDUPTHER

## 2018-02-28 RX ORDER — MONTELUKAST SODIUM 10 MG/1
TABLET ORAL
Qty: 90 TABLET | Refills: 2 | Status: SHIPPED | OUTPATIENT
Start: 2018-02-28 | End: 2018-11-25 | Stop reason: SDUPTHER

## 2018-02-28 NOTE — TELEPHONE ENCOUNTER
Patient informed she is due for an appointment; will call back to schedule once she gets insurance straightened out with new employment. She's not sure if she can still see [facility] physicians.

## 2018-04-30 RX ORDER — ERGOCALCIFEROL 1.25 MG/1
CAPSULE ORAL
Qty: 36 CAPSULE | Refills: 5 | Status: SHIPPED | OUTPATIENT
Start: 2018-04-30 | End: 2021-07-29

## 2018-06-29 ENCOUNTER — OFFICE VISIT (OUTPATIENT)
Dept: FAMILY MEDICINE CLINIC | Age: 38
End: 2018-06-29

## 2018-06-29 VITALS
SYSTOLIC BLOOD PRESSURE: 128 MMHG | WEIGHT: 178 LBS | HEIGHT: 61 IN | BODY MASS INDEX: 33.61 KG/M2 | DIASTOLIC BLOOD PRESSURE: 80 MMHG

## 2018-06-29 DIAGNOSIS — K21.9 CHRONIC GERD: ICD-10-CM

## 2018-06-29 DIAGNOSIS — L30.9 ECZEMA, UNSPECIFIED TYPE: ICD-10-CM

## 2018-06-29 DIAGNOSIS — G47.09 OTHER INSOMNIA: Primary | ICD-10-CM

## 2018-06-29 PROCEDURE — 99214 OFFICE O/P EST MOD 30 MIN: CPT | Performed by: FAMILY MEDICINE

## 2018-06-29 RX ORDER — OMEPRAZOLE 40 MG/1
40 CAPSULE, DELAYED RELEASE ORAL DAILY
Qty: 90 CAPSULE | Refills: 1 | Status: SHIPPED | OUTPATIENT
Start: 2018-06-29 | End: 2019-04-16 | Stop reason: SDUPTHER

## 2018-06-29 RX ORDER — ZOLPIDEM TARTRATE 5 MG/1
5 TABLET ORAL NIGHTLY PRN
Qty: 7 TABLET | Refills: 0 | Status: SHIPPED | OUTPATIENT
Start: 2018-06-29 | End: 2018-07-06

## 2018-06-29 RX ORDER — ZOLPIDEM TARTRATE 5 MG/1
TABLET ORAL
Qty: 90 TABLET | Refills: 0 | Status: SHIPPED | OUTPATIENT
Start: 2018-06-29 | End: 2018-07-29

## 2018-06-30 ASSESSMENT — ENCOUNTER SYMPTOMS
BLOOD IN STOOL: 0
TROUBLE SWALLOWING: 0
ABDOMINAL PAIN: 0

## 2018-07-02 ENCOUNTER — OFFICE VISIT (OUTPATIENT)
Dept: DERMATOLOGY | Age: 38
End: 2018-07-02

## 2018-07-02 DIAGNOSIS — L70.9 ADULT ACNE: ICD-10-CM

## 2018-07-02 DIAGNOSIS — Z80.8 FAMILY HISTORY OF MELANOMA: ICD-10-CM

## 2018-07-02 DIAGNOSIS — L30.9 DERMATITIS: ICD-10-CM

## 2018-07-02 DIAGNOSIS — Z12.83 SCREENING EXAM FOR SKIN CANCER: ICD-10-CM

## 2018-07-02 DIAGNOSIS — D22.9 MULTIPLE BENIGN NEVI: Primary | ICD-10-CM

## 2018-07-02 PROCEDURE — 99214 OFFICE O/P EST MOD 30 MIN: CPT | Performed by: DERMATOLOGY

## 2018-07-02 RX ORDER — TRIAMCINOLONE ACETONIDE 1 MG/G
CREAM TOPICAL
Qty: 45 G | Refills: 1 | Status: SHIPPED | OUTPATIENT
Start: 2018-07-02 | End: 2018-10-27 | Stop reason: ALTCHOICE

## 2018-07-02 RX ORDER — DAPSONE 75 MG/G
GEL TOPICAL
Qty: 90 G | Refills: 3 | Status: SHIPPED | OUTPATIENT
Start: 2018-07-02 | End: 2021-07-29

## 2018-07-02 NOTE — PROGRESS NOTES
delayed release capsule Take 1 capsule by mouth daily 90 capsule 1    zolpidem (AMBIEN) 5 MG tablet Take 1 tablet by mouth nightly as needed for Sleep for up to 7 days. Mordecai Rac[place] 7 tablet 0    hydrocortisone 2.5 % cream Apply topically 2 times daily 1 Tube 1    vitamin D (ERGOCALCIFEROL) 89771 units CAPS capsule TAKE 1 CAPSULE THREE TIMES A WEEK 36 capsule 5    montelukast (SINGULAIR) 10 MG tablet TAKE 1 TABLET DAILY 90 tablet 2    albuterol sulfate HFA (PROAIR HFA) 108 (90 Base) MCG/ACT inhaler Inhale 2 puffs into the lungs every 6 hours as needed for Wheezing 3 Inhaler 3    ondansetron (ZOFRAN) 4 MG tablet Take 1 tablet by mouth every 8 hours as needed for Nausea 20 tablet 0    Multiple Vitamin (ONE-A-DAY ESSENTIAL PO) Take 2 tablets by mouth daily      Calcium Citrate-Vitamin D 200-250 MG-UNIT TABS Take 1 tablet by mouth daily      calcium carbonate (OSCAL) 500 MG TABS tablet Take 500 mg by mouth daily      levonorgestrel (MIRENA) 20 MCG/24HR IUD 1 Each by Intrauterine route once. Social History: Works for [employer]. 4 children. Physical Examination     The following were examined and determined to be normal: Psych/Neuro, Scalp/hair, Conjunctivae/eyelids, Gums/teeth/lips, , Nails/digits and Genitalia/groin/buttocks. The following were examined and determined to be abnormal:  Head/face, Neck Breast/axilla/chest, Abdomen, Back, RUE, LUE, RLE and LLE. -General: Well-appearing, NAD  1. Scattered on the trunk and extremities are multiple well-defined round and oval symmetric smoothly-bordered uniformly brown macules and papules. -R mid forearm - round symmetric uniformly medium brown macule   2. L lower chin - 1 erythematous papule   3. Anterior neck - ill-defined mildly scaly pink patches     Assessment and Plan     1.  Benign acquired melanocytic nevi / family hx melanoma   -Recommend monthly self skin exams   -Educated regarding the ABCDEs of melanoma detection   -Call for any new/changing moles or concerning lesions  -Reviewed sun protective behavior -- sun avoidance during the peak hours of the day, sun-protective clothing (including hat and sunglasses), sunscreen use (water resistant, broad spectrum, SPF at least 30, need for reapplication every 2 to 3 hours), avoidance of tanning beds   -Return for full skin exam in 1 year (sooner if indicated)     2. Adult acne - mild inflammatory   -Aczone 7.5% gel qd. Edu re: irritation, category C.   -Okay to continue to use differin 0.1% gel. Advised to use as maintenance rather than as spot treatment. 3. Adult-onset atopic dermatitis   -Pt was educated re: chronicity, use of topical steroids for flares  -Triamcinolone 0.1% crm bid prn. Edu re: sparing use, atrophy, striae, hypopigmentation, telangiectasias.

## 2018-07-16 ENCOUNTER — HOSPITAL ENCOUNTER (OUTPATIENT)
Age: 38
Discharge: HOME | End: 2018-07-16
Payer: COMMERCIAL

## 2018-07-16 DIAGNOSIS — E78.2 MIXED HYPERLIPIDEMIA: ICD-10-CM

## 2018-07-16 DIAGNOSIS — E08.00 DIABETES MELLITUS DUE TO UNDERLYING CONDITION WITH HYPEROSMOLARITY WITHOUT COMA, WITHOUT LONG-TERM CURRENT USE OF INSULIN (HCC): ICD-10-CM

## 2018-07-16 LAB
A/G RATIO: 1.5 (ref 1.1–2.2)
ALBUMIN SERPL-MCNC: 4.6 G/DL (ref 3.4–5)
ALP BLD-CCNC: 78 U/L (ref 40–129)
ALT SERPL-CCNC: 17 U/L (ref 10–40)
ANION GAP SERPL CALCULATED.3IONS-SCNC: 14 MMOL/L (ref 3–16)
AST SERPL-CCNC: 16 U/L (ref 15–37)
BILIRUB SERPL-MCNC: 0.5 MG/DL (ref 0–1)
BUN BLDV-MCNC: 10 MG/DL (ref 7–20)
CALCIUM SERPL-MCNC: 9.7 MG/DL (ref 8.3–10.6)
CHLORIDE BLD-SCNC: 101 MMOL/L (ref 99–110)
CHOLESTEROL, TOTAL: 170 MG/DL (ref 0–199)
CO2: 26 MMOL/L (ref 21–32)
CREAT SERPL-MCNC: 0.6 MG/DL (ref 0.6–1.1)
GFR AFRICAN AMERICAN: >60
GFR NON-AFRICAN AMERICAN: >60
GLOBULIN: 3.1 G/DL
GLUCOSE BLD-MCNC: 121 MG/DL (ref 70–99)
HDLC SERPL-MCNC: 54 MG/DL (ref 40–60)
LDL CHOLESTEROL CALCULATED: 97 MG/DL
POTASSIUM SERPL-SCNC: 4.6 MMOL/L (ref 3.5–5.1)
SODIUM BLD-SCNC: 141 MMOL/L (ref 136–145)
TOTAL PROTEIN: 7.7 G/DL (ref 6.4–8.2)
TRIGL SERPL-MCNC: 93 MG/DL (ref 0–150)
VLDLC SERPL CALC-MCNC: 19 MG/DL

## 2018-07-16 PROCEDURE — 80061 LIPID PANEL: CPT

## 2018-07-16 PROCEDURE — 83036 HEMOGLOBIN GLYCOSYLATED A1C: CPT

## 2018-07-16 PROCEDURE — 80053 COMPREHEN METABOLIC PANEL: CPT

## 2018-07-16 PROCEDURE — 36415 COLL VENOUS BLD VENIPUNCTURE: CPT

## 2018-07-17 LAB
ESTIMATED AVERAGE GLUCOSE: 105.4 MG/DL
HBA1C MFR BLD: 5.3 %

## 2018-07-18 ENCOUNTER — OFFICE VISIT (OUTPATIENT)
Dept: ENDOCRINOLOGY | Age: 38
End: 2018-07-18

## 2018-07-18 VITALS
BODY MASS INDEX: 34.21 KG/M2 | OXYGEN SATURATION: 98 % | WEIGHT: 181.2 LBS | DIASTOLIC BLOOD PRESSURE: 70 MMHG | HEIGHT: 61 IN | SYSTOLIC BLOOD PRESSURE: 100 MMHG | HEART RATE: 89 BPM

## 2018-07-18 DIAGNOSIS — E55.9 VITAMIN D DEFICIENCY: ICD-10-CM

## 2018-07-18 DIAGNOSIS — E66.9 DIABETES MELLITUS TYPE 2 IN OBESE (HCC): Primary | ICD-10-CM

## 2018-07-18 DIAGNOSIS — E11.69 DIABETES MELLITUS TYPE 2 IN OBESE (HCC): Primary | ICD-10-CM

## 2018-07-18 DIAGNOSIS — E66.9 OBESITY (BMI 30.0-34.9): ICD-10-CM

## 2018-07-18 DIAGNOSIS — E66.09 CLASS 1 OBESITY DUE TO EXCESS CALORIES WITH BODY MASS INDEX (BMI) OF 34.0 TO 34.9 IN ADULT, UNSPECIFIED WHETHER SERIOUS COMORBIDITY PRESENT: ICD-10-CM

## 2018-07-18 PROCEDURE — 99214 OFFICE O/P EST MOD 30 MIN: CPT | Performed by: NURSE PRACTITIONER

## 2018-07-18 ASSESSMENT — PATIENT HEALTH QUESTIONNAIRE - PHQ9
2. FEELING DOWN, DEPRESSED OR HOPELESS: 0
SUM OF ALL RESPONSES TO PHQ QUESTIONS 1-9: 0
1. LITTLE INTEREST OR PLEASURE IN DOING THINGS: 0
SUM OF ALL RESPONSES TO PHQ9 QUESTIONS 1 & 2: 0

## 2018-07-18 ASSESSMENT — ENCOUNTER SYMPTOMS
SHORTNESS OF BREATH: 0
EYE PAIN: 0
COLOR CHANGE: 0
DIARRHEA: 0
CONSTIPATION: 0
NAUSEA: 0

## 2018-07-18 NOTE — PROGRESS NOTES
or numbness in extremeties  · Nephropathy labs are WNL  · Retinopathy recent eye check with no concerns    Diabetic Health Maintenance   · Last Eye Exam: 11/2017 @ [facility], blurry vision with prednisone, everything was fine per pt, and vision is now improved. · Last Foot exam: in the past for foot injury  · Has patient seen a dietitian? Yes  · Current Exercise: tries to run about 3-4 times a week  · On ACEI or ARB: not on medication   · On statin: not on a statin    Risk Factors  · Smoker: never smoker  · ETOH: negligible    Vitamin D deficiency: Currently is on 50 K weekly. Lab Results   Component Value Date    VITD25 38.3 06/20/2017    VITD25 88.5 09/16/2016    VITD25 >120.0 03/11/2016     Hyperlipidemia: Currently is on no statin. Current complaints include  Lab Results   Component Value Date    CHOL 170 07/16/2018    CHOL 172 06/20/2017    CHOL 163 09/16/2016     Lab Results   Component Value Date    TRIG 93 07/16/2018    TRIG 98 06/20/2017    TRIG 78 09/16/2016     Lab Results   Component Value Date    HDL 54 07/16/2018    HDL 51 06/20/2017    HDL 52 09/16/2016    HDL 48 11/04/2011    HDL 35 05/24/2010     Lab Results   Component Value Date    LDLCALC 97 07/16/2018    LDLCALC 101 06/20/2017    [address] 09/16/2016     Lab Results   Component Value Date    LDLDIRECT 90 12/31/2014    LDLDIRECT 95 07/17/2013    LDLDIRECT 52 01/10/2013     No results found for: CHOLHDLRATIO    Weight gain/Obesity  Patient reports concern at not being able to lose the 20 lbs plus has gained weight with prednisone.  BMI 34.81    Past Medical History:   Diagnosis Date    Asthma     Depression     ONLY POSTPARTEM    Hyperlipidemia 3/8/2013    Obesity     Type II or unspecified type diabetes mellitus without mention of complication, not stated as uncontrolled      Family History   Problem Relation Age of Onset    High Blood Pressure Mother     High Cholesterol Father     Diabetes Father     Cancer Father         melanoma, Signs of onychomycosis are not noted on the skin. Calluses are not noted. Ingrown toenails are not noted. Toenails are normal. Pulses are +2 DP and +2 PT bilaterally. Capillary refill is <3 seconds. Skeletal structures are intact upon visual examination. No deformity is noted. Sensory: 10 g monofilament is 10/10 on the right and 10/10 on the left, 128 Hz vibration sense is present bilaterally. Fiorella Sorto is a 42year old female with a history of DMT2, hyperlipidemia and obesity. She is currently on no medications for the above s/p gastric sleeve surgery in 2015. Plan  Problem List Items Addressed This Visit     Class 1 obesity in adult     BMI 34.81  concerned with discomfort with Bydureon and large needle: will trial trulicity for 1 month  Needs to decrease carb and caloric intake         Relevant Medications    Exenatide (BYDUREON) 2 MG PEN    Dulaglutide (TRULICITY) 8.12 CJ/8.4DQ SOPN    Diabetes mellitus type 2 in obese (HCC) - Primary     A1c improved from previous @ 5.3         Relevant Medications    Exenatide (BYDUREON) 2 MG PEN    Dulaglutide (TRULICITY) 4.80 GI/7.4KJ SOPN    Obesity (BMI 30.0-34.9)     S/p  Gastric sleeve  Sees bariatric surgeon at          Relevant Medications    Exenatide (BYDUREON) 2 MG PEN    Dulaglutide (TRULICITY) 0.24 FI/9.5BK SOPN    Vitamin D deficiency     Levels are in ref range             Return in about 6 months (around 1/18/2019).

## 2018-08-02 ENCOUNTER — PATIENT MESSAGE (OUTPATIENT)
Dept: ENDOCRINOLOGY | Age: 38
End: 2018-08-02

## 2018-10-27 ENCOUNTER — HOSPITAL ENCOUNTER (EMERGENCY)
Age: 38
Discharge: HOME | End: 2018-10-27
Payer: COMMERCIAL

## 2018-10-27 VITALS
HEART RATE: 94 BPM | DIASTOLIC BLOOD PRESSURE: 88 MMHG | WEIGHT: 165 LBS | OXYGEN SATURATION: 96 % | HEIGHT: 60 IN | SYSTOLIC BLOOD PRESSURE: 116 MMHG | BODY MASS INDEX: 32.39 KG/M2 | TEMPERATURE: 98.5 F | RESPIRATION RATE: 16 BRPM

## 2018-10-27 DIAGNOSIS — F41.0 ANXIETY ATTACK: Primary | ICD-10-CM

## 2018-10-27 DIAGNOSIS — T50.905A ADVERSE EFFECT OF DRUG, INITIAL ENCOUNTER: ICD-10-CM

## 2018-10-27 PROCEDURE — 99283 EMERGENCY DEPT VISIT LOW MDM: CPT

## 2018-10-27 PROCEDURE — 96374 THER/PROPH/DIAG INJ IV PUSH: CPT

## 2018-10-27 PROCEDURE — 96372 THER/PROPH/DIAG INJ SC/IM: CPT

## 2018-10-27 PROCEDURE — 6360000002 HC RX W HCPCS: Performed by: NURSE PRACTITIONER

## 2018-10-27 RX ORDER — HYDROXYZINE HYDROCHLORIDE 25 MG/1
25 TABLET, FILM COATED ORAL EVERY 6 HOURS PRN
Qty: 20 TABLET | Refills: 0 | Status: SHIPPED | OUTPATIENT
Start: 2018-10-27 | End: 2018-10-29 | Stop reason: ALTCHOICE

## 2018-10-27 RX ORDER — KETOROLAC TROMETHAMINE 30 MG/ML
30 INJECTION, SOLUTION INTRAMUSCULAR; INTRAVENOUS ONCE
Status: COMPLETED | OUTPATIENT
Start: 2018-10-27 | End: 2018-10-27

## 2018-10-27 RX ORDER — LORAZEPAM 2 MG/ML
1 INJECTION INTRAMUSCULAR ONCE
Status: COMPLETED | OUTPATIENT
Start: 2018-10-27 | End: 2018-10-27

## 2018-10-27 RX ADMIN — KETOROLAC TROMETHAMINE 30 MG: 30 INJECTION, SOLUTION INTRAMUSCULAR at 19:37

## 2018-10-27 RX ADMIN — LORAZEPAM 1 MG: 2 INJECTION, SOLUTION INTRAMUSCULAR; INTRAVENOUS at 19:37

## 2018-10-27 ASSESSMENT — PAIN SCALES - GENERAL
PAINLEVEL_OUTOF10: 5
PAINLEVEL_OUTOF10: 5

## 2018-10-27 ASSESSMENT — PAIN DESCRIPTION - DESCRIPTORS: DESCRIPTORS: DULL;CONSTANT

## 2018-10-27 NOTE — ED PROVIDER NOTES
- No data to display     Remainder of labs reviewed and werenegative at this time or not returned at the time of this note. RADIOLOGY:   Non-plain film images such as CT, Ultrasound and MRI are read by the radiologist. Martina EASTON APRN - CNP have directly visualized the radiologic plain film image(s) with the below findings:        Interpretation per the Radiologist below, if available at the time of thisnote:    No orders to display        No results found. MEDICAL DECISION MAKING / ED COURSE:      PROCEDURES:   Procedures    None    Patient was given:  Medications   LORazepam (ATIVAN) injection 1 mg (1 mg Intravenous Given 10/27/18 1937)   ketorolac (TORADOL) injection 30 mg (30 mg Intramuscular Given 10/27/18 1937)       Patient presents emergency Department anxiety, states she was started on a new medication by her bariatric surgeon, states that after taking a couple days she is now very anxious, feels panicky and tearful. States he did have a hard time sleeping at night even while taking Benadryl and melatonin. She also reports that she has Ambien around to help her sleep. She is tearful on exam however she does consent for safety denying any homicidal or suicidal ideations. Patient was given Ativan for anxiety and Toradol for her headache. She was monitored here in the emergency department. Upon reevaluation vital signs are stable, she reports complete resolution of headache and states her anxiety feels much better. I did educate her that I would start her on something to help her anxiety however that she needs to follow-up with her bariatric surgeon that she is really too sensitive to this type of medication as it is causing too much of a stimulant reaction. At this time I do not feel this area to perform any additional diagnostic testing as the patient has unremarkable neurological exam, states that she feels better and in general this is reaction to medication.     Therefore, shared

## 2018-10-27 NOTE — ED NOTES
Pt given a soda and crackers per request. Patient shivering. Patient given a warm blanket for comfort. Pt's friend at bedside. Patient states she is anxious. Patient is calm and not crying.       Shaylee Sarmiento LPN  04/62/67 [id number]

## 2018-10-28 ASSESSMENT — ENCOUNTER SYMPTOMS
BACK PAIN: 0
NAUSEA: 0
DIARRHEA: 0
ABDOMINAL PAIN: 0
COLOR CHANGE: 0
VOMITING: 0
COUGH: 0
SHORTNESS OF BREATH: 0

## 2018-10-29 ENCOUNTER — OFFICE VISIT (OUTPATIENT)
Dept: FAMILY MEDICINE CLINIC | Age: 38
End: 2018-10-29
Payer: COMMERCIAL

## 2018-10-29 VITALS
HEART RATE: 87 BPM | DIASTOLIC BLOOD PRESSURE: 80 MMHG | WEIGHT: 167 LBS | OXYGEN SATURATION: 100 % | BODY MASS INDEX: 32.61 KG/M2 | SYSTOLIC BLOOD PRESSURE: 100 MMHG

## 2018-10-29 DIAGNOSIS — F32.9 REACTIVE DEPRESSION: Primary | ICD-10-CM

## 2018-10-29 PROCEDURE — 99214 OFFICE O/P EST MOD 30 MIN: CPT | Performed by: PHYSICIAN ASSISTANT

## 2018-10-29 RX ORDER — CITALOPRAM 10 MG/1
10 TABLET ORAL DAILY
Qty: 30 TABLET | Refills: 3 | Status: SHIPPED | OUTPATIENT
Start: 2018-10-29 | End: 2018-11-27 | Stop reason: ALTCHOICE

## 2018-10-29 RX ORDER — HYDROXYZINE HYDROCHLORIDE 25 MG/1
25 TABLET, FILM COATED ORAL EVERY 6 HOURS PRN
Qty: 90 TABLET | Refills: 0 | Status: SHIPPED | OUTPATIENT
Start: 2018-10-29 | End: 2018-11-26 | Stop reason: SDUPTHER

## 2018-10-29 ASSESSMENT — ENCOUNTER SYMPTOMS
NAUSEA: 0
ABDOMINAL PAIN: 0
VOMITING: 0
DIARRHEA: 0

## 2018-10-29 NOTE — PROGRESS NOTES
10/29/2018  Elvira Moreira (: 1980)  34 y.o. HPI  The patient is here for follow up of ED visit on 10/27/2018. She was diagnosed with anxiety attack and adverse effect of drug, specifically phentermine. The patient started this medication on Wednesday and became symptomatic on Friday. She is reports that symptoms started after she began taking the phentermine however, she has had two major life changes occur on Monday of last week:  She found out that she is losing her home and her  is filing for divorce. Current symptoms: crying daily, not able stay asleep (3-4 hours per night), anxiety, dysphoric mood. She denies any SI, HI, hallucinations, or paranoia. She stopped phentermine on Friday, no change in symptoms. She is already taking Ambien 5 mg for sleep. She does have a hx of post partum depression that responded to SSRI but cannot recall the name of medication. Review of Systems   Constitutional: Positive for appetite change and fatigue. Negative for activity change. Gastrointestinal: Negative for abdominal pain, diarrhea, nausea and vomiting. Neurological: Negative for dizziness, weakness, light-headedness, numbness and headaches. Psychiatric/Behavioral: Positive for dysphoric mood and sleep disturbance. Negative for agitation, behavioral problems, confusion, decreased concentration, hallucinations, self-injury and suicidal ideas. The patient is nervous/anxious. The patient is not hyperactive. Allergies, past medical history, family history, and social history reviewed and unchanged from previous encounter.      Current Outpatient Prescriptions   Medication Sig Dispense Refill    citalopram (CELEXA) 10 MG tablet Take 1 tablet by mouth daily 30 tablet 3    hydrOXYzine (ATARAX) 25 MG tablet Take 1 tablet by mouth every 6 hours as needed for Itching 90 tablet 0    Dulaglutide 1.5 MG/0.5ML SOPN Inject 1.5 mg into the skin once a week 12 pen 1    Dapsone (ACZONE) 7.5 % GEL Apply to face daily. 90 g 3    omeprazole (PRILOSEC) 40 MG delayed release capsule Take 1 capsule by mouth daily 90 capsule 1    vitamin D (ERGOCALCIFEROL) 02405 units CAPS capsule TAKE 1 CAPSULE THREE TIMES A WEEK 36 capsule 5    montelukast (SINGULAIR) 10 MG tablet TAKE 1 TABLET DAILY 90 tablet 2    albuterol sulfate HFA (PROAIR HFA) 108 (90 Base) MCG/ACT inhaler Inhale 2 puffs into the lungs every 6 hours as needed for Wheezing 3 Inhaler 3    Multiple Vitamin (ONE-A-DAY ESSENTIAL PO) Take 2 tablets by mouth daily      Calcium Citrate-Vitamin D 200-250 MG-UNIT TABS Take 1 tablet by mouth daily      calcium carbonate (OSCAL) 500 MG TABS tablet Take 500 mg by mouth daily      levonorgestrel (MIRENA) 20 MCG/24HR IUD 1 Each by Intrauterine route once. No current facility-administered medications for this visit. Vitals:    10/29/18 1302   BP: 100/80   Site: Left Upper Arm   Position: Sitting   Cuff Size: Small Adult   Pulse: 87   SpO2: 100%   Weight: 167 lb (75.8 kg)     Estimated body mass index is 32.61 kg/m² as calculated from the following:    Height as of 10/27/18: 5' (1.524 m). Weight as of this encounter: 167 lb (75.8 kg). Physical Exam   Constitutional: She appears well-developed and well-nourished. HENT:   Head: Normocephalic and atraumatic. Mouth/Throat: Mucous membranes are normal.   Neck: Normal range of motion. Neck supple. No thyromegaly present. Cardiovascular: Normal rate, regular rhythm and normal heart sounds. Pulmonary/Chest: Effort normal and breath sounds normal.   Skin: No pallor. Psychiatric: Her speech is normal. Judgment and thought content normal. Her mood appears anxious. She is withdrawn. Cognition and memory are normal. She exhibits a depressed mood. Vitals reviewed. ASSESSMENT and PLAN:  Genaro Kirby was seen today for other. Diagnoses and all orders for this visit:    Reactive depression  -     citalopram (CELEXA) 10 MG tablet;  Take 1 tablet by mouth daily  -     hydrOXYzine (ATARAX) 25 MG tablet; Take 1 tablet by mouth every 6 hours as needed for Itching\  -    Continue Ambien for sleep, add melatonin. -    Discussed need to call 911 should she develop suicidal ideation.   -   Phentermine likely exacerbating symptoms. Should hold off on taking until resolution of depressive episode. Pt to call and discuss with Bariatrics. Return in about 4 weeks (around 11/26/2018) for anxiety and depression.

## 2018-10-31 ENCOUNTER — TELEPHONE (OUTPATIENT)
Dept: FAMILY MEDICINE CLINIC | Age: 38
End: 2018-10-31

## 2018-10-31 RX ORDER — BUSPIRONE HYDROCHLORIDE 10 MG/1
10 TABLET ORAL 2 TIMES DAILY
Qty: 60 TABLET | Refills: 1 | Status: SHIPPED | OUTPATIENT
Start: 2018-10-31 | End: 2019-01-16 | Stop reason: ALTCHOICE

## 2018-11-27 ENCOUNTER — OFFICE VISIT (OUTPATIENT)
Dept: FAMILY MEDICINE CLINIC | Age: 38
End: 2018-11-27
Payer: COMMERCIAL

## 2018-11-27 VITALS
SYSTOLIC BLOOD PRESSURE: 102 MMHG | HEIGHT: 60 IN | HEART RATE: 100 BPM | BODY MASS INDEX: 32.39 KG/M2 | WEIGHT: 165 LBS | DIASTOLIC BLOOD PRESSURE: 72 MMHG | OXYGEN SATURATION: 100 %

## 2018-11-27 DIAGNOSIS — G47.09 OTHER INSOMNIA: ICD-10-CM

## 2018-11-27 DIAGNOSIS — F41.9 ANXIETY AND DEPRESSION: Primary | ICD-10-CM

## 2018-11-27 DIAGNOSIS — F32.A ANXIETY AND DEPRESSION: Primary | ICD-10-CM

## 2018-11-27 PROCEDURE — 99213 OFFICE O/P EST LOW 20 MIN: CPT | Performed by: FAMILY MEDICINE

## 2018-11-27 RX ORDER — VENLAFAXINE 37.5 MG/1
75 TABLET ORAL
COMMUNITY
Start: 2018-11-09 | End: 2021-07-29

## 2018-11-27 RX ORDER — ZOLPIDEM TARTRATE 5 MG/1
TABLET ORAL
Qty: 90 TABLET | Refills: 0 | Status: CANCELLED | OUTPATIENT
Start: 2018-11-27 | End: 2018-12-27

## 2018-11-27 RX ORDER — PHENTERMINE HYDROCHLORIDE 37.5 MG/1
TABLET ORAL
COMMUNITY
Start: 2018-11-09 | End: 2018-12-10

## 2018-11-27 RX ORDER — TRAZODONE HYDROCHLORIDE 50 MG/1
50-100 TABLET ORAL NIGHTLY
Qty: 60 TABLET | Refills: 2 | Status: SHIPPED | OUTPATIENT
Start: 2018-11-27 | End: 2019-02-17 | Stop reason: SDUPTHER

## 2018-12-02 NOTE — PROGRESS NOTES
2018     Elvira Moreira (:  1980) is a 34 y.o. female, here for evaluation of the following medical concerns:    Chief complaint: Patient presents with: Anxiety  Depression     Past medical, surgical, family and social history, as well as current medications and allergies, were reviewed and updated with the patient. Has been taking Adipex from bariatrics specialist.       Mood Disorder:  Patient presents for follow-up of depression and anxiety disorder. Current complaints include: insomnia. She denies any other symptoms. Symptoms/signs of sanjay: none. External stressors: employment concern. Current treatment includes: Effexor- started by her bariatric physician, who stopped Celexa due to potential for weight gain. Medication side effects: none. Insomnia:  Current treatment: prescription sleep aid- Ambien- had been using it occasionally, but states bariatrician instructed her to take it everyday, which has been somewhat effective. Medication side effects: none. Review of Systems   Constitutional: Negative for unexpected weight change. Cardiovascular: Negative for chest pain. Psychiatric/Behavioral: Positive for sleep disturbance. The patient is not nervous/anxious. Prior to Visit Medications    Medication Sig Taking?  Authorizing Provider   montelukast (SINGULAIR) 10 MG tablet TAKE 1 TABLET DAILY Yes Antoni Lang MD   hydrOXYzine (ATARAX) 25 MG tablet Take 1 tablet by mouth every 6 hours as needed for Itching Yes Antoni Lang MD   venlafaxine (EFFEXOR) 37.5 MG tablet Take 37.5 mg by mouth Yes Historical Provider, MD   phentermine (ADIPEX-P) 37.5 MG tablet Take one half tablet in am and second half in pm between 12 to 1 pm. Yes Historical Provider, MD   traZODone (DESYREL) 50 MG tablet Take 1-2 tablets by mouth nightly Yes Antoni Lang MD   busPIRone (BUSPAR) 10 MG tablet Take 1 tablet by mouth 2 times daily Yes WALTER Farley   Dulaglutide 1.5 MG/0.5ML SOPN Inject 1.5 mg into the skin once a week Yes OPHELIA Garcia - CNP   Dapsone (ACZONE) 7.5 % GEL Apply to face daily. Yes Paige Cramer MD   omeprazole (PRILOSEC) 40 MG delayed release capsule Take 1 capsule by mouth daily Yes Madhavi Murphy MD   vitamin D (ERGOCALCIFEROL) 90730 units CAPS capsule TAKE 1 CAPSULE THREE TIMES A WEEK Yes Nicolasa Nguyen MD   albuterol sulfate HFA (PROAIR HFA) 108 (90 Base) MCG/ACT inhaler Inhale 2 puffs into the lungs every 6 hours as needed for Wheezing Yes Madhavi Murphy MD   Multiple Vitamin (ONE-A-DAY ESSENTIAL PO) Take 2 tablets by mouth daily Yes Historical Provider, MD   Calcium Citrate-Vitamin D 200-250 MG-UNIT TABS Take 1 tablet by mouth daily Yes Historical Provider, MD   calcium carbonate (OSCAL) 500 MG TABS tablet Take 500 mg by mouth daily Yes Historical Provider, MD   levonorgestrel (MIRENA) 20 MCG/24HR IUD 1 Each by Intrauterine route once. Yes Historical Provider, MD        Social History   Substance Use Topics    Smoking status: Never Smoker    Smokeless tobacco: Never Used    Alcohol use No      Comment: rarely        Vitals:    11/27/18 1411   BP: 102/72   Site: Right Upper Arm   Position: Sitting   Cuff Size: Small Adult   Pulse: 100   SpO2: 100%   Weight: 165 lb (74.8 kg)   Height: 5' (1.524 m)     Estimated body mass index is 32.22 kg/m² as calculated from the following:    Height as of this encounter: 5' (1.524 m). Weight as of this encounter: 165 lb (74.8 kg). Physical Exam   Constitutional: Vital signs are normal. She appears well-developed and well-nourished. She is active and cooperative. HENT:   Head: Normocephalic and atraumatic. Right Ear: External ear normal.   Left Ear: External ear normal.   Nose: Nose normal.   Eyes: Conjunctivae are normal. No scleral icterus. Pulmonary/Chest: Effort normal.   Psychiatric: She has a normal mood and affect.  Her speech is normal. Judgment and thought content normal. She is slowed (seems mildly sedated,

## 2018-12-03 ENCOUNTER — OFFICE VISIT (OUTPATIENT)
Dept: ORTHOPEDIC SURGERY | Age: 38
End: 2018-12-03
Payer: COMMERCIAL

## 2018-12-03 VITALS — HEIGHT: 60 IN | WEIGHT: 164.9 LBS | BODY MASS INDEX: 32.38 KG/M2 | RESPIRATION RATE: 18 BRPM

## 2018-12-03 DIAGNOSIS — R52 PAIN: Primary | ICD-10-CM

## 2018-12-03 DIAGNOSIS — M77.11 RIGHT LATERAL EPICONDYLITIS: ICD-10-CM

## 2018-12-03 DIAGNOSIS — G56.01 RIGHT CARPAL TUNNEL SYNDROME: ICD-10-CM

## 2018-12-03 PROCEDURE — L3908 WHO COCK-UP NONMOLDE PRE OTS: HCPCS | Performed by: ORTHOPAEDIC SURGERY

## 2018-12-03 PROCEDURE — 99203 OFFICE O/P NEW LOW 30 MIN: CPT | Performed by: ORTHOPAEDIC SURGERY

## 2018-12-03 PROCEDURE — MISCD86 TENNIS ELBOW STRAP-BREG: Performed by: ORTHOPAEDIC SURGERY

## 2018-12-03 NOTE — PROGRESS NOTES
HISTORY OF PRESENT ILLNESS:  The patient is a 33-year-old right-hand-dominant female who presents today for a new hand surgery and elbow specialty evaluation regarding her right arm. She works as a healthcare analyst and has been having increasing right lateral elbow pain over the past month. She is now at the point where even lifting simple objects can be tender. She also reports that at nighttime she's been feeling some numbness in the entire right hand. She has not had any other intervention. She denies any neck pain or prior trauma. PAST MEDICAL HISTORY: Patient's medications, allergies, past medical, surgical, social and family histories were reviewed and updated as appropriate. ROS: Pertinent items are noted in HPI. Review of systems reviewed from patient history form dated on 12/3/18 and available in the patient's chart under the media tab. Denies fever, chills, confusion, bowel/bladder active change. PHYSICAL EXAMINATION: Examination reveals a pleasant individual in no acute distress. There appears to be satisfactory pain-free range of motion, strength, and stability of the cervical spine, shoulders, wrists, and hands. Skin is intact without lymphadenopathy, discoloration, or abnormal temperature. There is intact, symmetric circulation in both upper extremities. At the right elbow, there is tenderness at the extremes of maximum flexion and extension and pain with firm pressure at the common extensor origin. There is no dramatic tenderness at the radial tunnel. Pain is reproduced with resisted wrist and long finger extension. There is no elbow instability or mechanical locking. There is good firing of the biceps and triceps. Along the right hand provocative testing for right carpal tunnel syndrome does seem to increase numbness and tingling in the median nerve distribution. There is no sign of any muscle atrophy.     DIAGNOSTIC TESTING: Three views of the right elbow reveal no sign of

## 2018-12-27 DIAGNOSIS — R92.8 ABNORMAL MAMMOGRAM OF RIGHT BREAST: Primary | ICD-10-CM

## 2019-01-16 ENCOUNTER — OFFICE VISIT (OUTPATIENT)
Dept: ENDOCRINOLOGY | Age: 39
End: 2019-01-16
Payer: COMMERCIAL

## 2019-01-16 VITALS
HEART RATE: 102 BPM | HEIGHT: 61 IN | OXYGEN SATURATION: 96 % | SYSTOLIC BLOOD PRESSURE: 126 MMHG | RESPIRATION RATE: 20 BRPM | BODY MASS INDEX: 29.79 KG/M2 | WEIGHT: 157.8 LBS | DIASTOLIC BLOOD PRESSURE: 82 MMHG

## 2019-01-16 DIAGNOSIS — E11.69 DIABETES MELLITUS TYPE 2 IN OBESE (HCC): Primary | ICD-10-CM

## 2019-01-16 DIAGNOSIS — E55.9 VITAMIN D DEFICIENCY: ICD-10-CM

## 2019-01-16 DIAGNOSIS — E78.2 MIXED HYPERLIPIDEMIA: ICD-10-CM

## 2019-01-16 DIAGNOSIS — E66.9 DIABETES MELLITUS TYPE 2 IN OBESE (HCC): Primary | ICD-10-CM

## 2019-01-16 LAB — HBA1C MFR BLD: 4.9 %

## 2019-01-16 PROCEDURE — 99213 OFFICE O/P EST LOW 20 MIN: CPT | Performed by: INTERNAL MEDICINE

## 2019-01-16 PROCEDURE — 83036 HEMOGLOBIN GLYCOSYLATED A1C: CPT | Performed by: INTERNAL MEDICINE

## 2019-02-18 RX ORDER — TRAZODONE HYDROCHLORIDE 50 MG/1
50-100 TABLET ORAL NIGHTLY
Qty: 60 TABLET | Refills: 1 | Status: SHIPPED | OUTPATIENT
Start: 2019-02-18 | End: 2019-04-16 | Stop reason: SDUPTHER

## 2019-04-16 DIAGNOSIS — K21.9 CHRONIC GERD: ICD-10-CM

## 2019-04-16 RX ORDER — MONTELUKAST SODIUM 10 MG/1
10 TABLET ORAL NIGHTLY
Qty: 90 TABLET | Refills: 1 | Status: SHIPPED | OUTPATIENT
Start: 2019-04-16 | End: 2019-10-14 | Stop reason: SDUPTHER

## 2019-04-16 RX ORDER — OMEPRAZOLE 40 MG/1
40 CAPSULE, DELAYED RELEASE ORAL DAILY
Qty: 90 CAPSULE | Refills: 1 | Status: SHIPPED | OUTPATIENT
Start: 2019-04-16 | End: 2019-10-14 | Stop reason: SDUPTHER

## 2019-04-16 RX ORDER — TRAZODONE HYDROCHLORIDE 50 MG/1
50-100 TABLET ORAL NIGHTLY
Qty: 60 TABLET | Refills: 5 | OUTPATIENT
Start: 2019-04-16

## 2019-04-16 NOTE — TELEPHONE ENCOUNTER
.Last seen- 11/27/18    Last written- 11/27/18 MONTELUKAST, 06/29/18 OMEPRAZOLE    Next office visit- NA

## 2019-04-24 ENCOUNTER — TELEPHONE (OUTPATIENT)
Dept: FAMILY MEDICINE CLINIC | Age: 39
End: 2019-04-24

## 2019-04-25 NOTE — TELEPHONE ENCOUNTER
Pt states she is going to [place] specifically, [place], [place], [place] and [place]. She would like to get Vaccines that she may not be up to date on such as MMR or tetanus or anything else. As well as Travel vaccines. Please advise.

## 2019-04-30 ENCOUNTER — OFFICE VISIT (OUTPATIENT)
Dept: FAMILY MEDICINE CLINIC | Age: 39
End: 2019-04-30
Payer: COMMERCIAL

## 2019-04-30 VITALS
HEART RATE: 85 BPM | WEIGHT: 162 LBS | HEIGHT: 61 IN | DIASTOLIC BLOOD PRESSURE: 70 MMHG | SYSTOLIC BLOOD PRESSURE: 104 MMHG | OXYGEN SATURATION: 98 % | BODY MASS INDEX: 30.58 KG/M2

## 2019-04-30 DIAGNOSIS — Z23 NEED FOR HEPATITIS A AND B VACCINATION: ICD-10-CM

## 2019-04-30 DIAGNOSIS — Z23 NEED FOR MMR VACCINE: ICD-10-CM

## 2019-04-30 DIAGNOSIS — Z71.84 TRAVEL ADVICE ENCOUNTER: Primary | ICD-10-CM

## 2019-04-30 PROCEDURE — 90471 IMMUNIZATION ADMIN: CPT | Performed by: FAMILY MEDICINE

## 2019-04-30 PROCEDURE — 90746 HEPB VACCINE 3 DOSE ADULT IM: CPT | Performed by: FAMILY MEDICINE

## 2019-04-30 PROCEDURE — 90472 IMMUNIZATION ADMIN EACH ADD: CPT | Performed by: FAMILY MEDICINE

## 2019-04-30 PROCEDURE — 90632 HEPA VACCINE ADULT IM: CPT | Performed by: FAMILY MEDICINE

## 2019-04-30 PROCEDURE — 90707 MMR VACCINE SC: CPT | Performed by: FAMILY MEDICINE

## 2019-04-30 NOTE — PATIENT INSTRUCTIONS
Patient Education        Learning About Healthy Travel Abroad  How can you stay healthy on your trip? The best way to stay healthy on your trip is to plan ahead. Talk with your doctor several months before you travel to another country. It's important to allow enough time to get the vaccine doses that you need. For example, if you need the hepatitis A vaccine, you'll need 2 doses spaced at least 6 months apart. Also ask your doctor if there are medicines or extra safety steps that you should take. Check with your local health department or travel health clinic for other travel tips. What can you do to prevent health problems? Get needed vaccines  · Make sure you are up to date with your routine shots. They can protect you from diseases such as polio, diphtheria, and measles. These diseases are still a problem in some developing countries. · Get other vaccines you need. Your doctor or a health clinic can tell you which ones you need for your travels. Here are some examples:  ? Hepatitis A vaccine, if you travel to developing countries. ? Yellow fever vaccine, if you visit places in [place] and [place] where the disease is active. ? Typhoid fever vaccine, if you travel to [place] and [place], [place], or some areas of [place]. Bring medicines with you  · If you take medicines, bring a supply that will last the length of your trip. Get a letter from your doctor that lists your medical conditions and the medicines you take. Bring prescriptions for refills if you will be gone for a long time. Also bring any medical supplies you may need such as blood sugar testing supplies or insulin needles. · If you are going to an area where malaria is a risk, ask your doctor or health clinic for a prescription to help prevent infection. This medicine works best if you take it before, during, and after your trip. · You may want to bring medicine for traveler's diarrhea.  Over-the-counter medicines include:  ? Bismuth subsalicylate (Pepto-Bismol). ? Loperamide (Imodium). Your doctor may also prescribe an antibiotic to take with you. This can treat diarrhea if you're going to an area where modern medical care isn't readily available. Make safer choices as you travel  · Practice safer sex. Using condoms can prevent sexually transmitted infections. · In malaria-infected areas, use DEET insect repellent. Wear long pants and long-sleeved shirts, especially from dusk to mohit. Use mosquito netting to protect yourself from bites while you sleep. · Many developing countries don't have safe tap water. Only have drinks made with boiled water, such as tea and coffee. Canned or bottled carbonated drinks, such as soda, beer, wine, or water, are usually safe. Don't use ice if you don't know what kind of water was used to make it. And don't use tap water to brush your teeth. · Be aware that you could be injured in cars, boats, or public transportation. Driving can be dangerous due to bad roads, poor  training, and crowded roadways. If you hire a  or taxi, ask the  to slow down or drive more carefully if you feel unsafe. · Air pollution in some large cities can be a problem if you have asthma or other breathing problems. Avoid those cities when air quality is poor. Or stay indoors as much as possible. · Be careful around dogs and other animals. Dogs in developing countries are often not tame and may bite. Rabies is more common in tropical and subtropical regions. · If you're going to a place that's much higher above sea level than you're used to, ask your doctor how to avoid altitude sickness. He or she may also prescribe medicine to help treat it. Where can you get the best information? · Use the Internet to find travel health information. Try these websites:  ? www.cdc.gov/travel. This website is for the Centers for Disease Control and Prevention (CDC). ? www.who.int/ith/en.  This website lists

## 2019-05-03 ASSESSMENT — ENCOUNTER SYMPTOMS
RESPIRATORY NEGATIVE: 1
ALLERGIC/IMMUNOLOGIC NEGATIVE: 1
GASTROINTESTINAL NEGATIVE: 1

## 2019-05-03 NOTE — PROGRESS NOTES
2019     Elvira Moreira (:  1980) is a 34 y.o. female, here for evaluation of the following medical concerns:    Chief complaint: Patient presents with:  Travel Consult: normal booster, asking for MMR and Hep B vaccines and travel vaccines to [place]      Past medical, surgical, family and social history, as well as current medications and allergies, were reviewed and updated with the patient. Travel Consult: Becca Gilmore is here for travel consultation. Planned departure date: 2019           Countries of travel: [place], [place], [place]  Areas in country: urban    Accommodations: hotel  Purpose of travel: going with child on school trip  Prior travel out of [place]: yes (has been to [place])  Currently ill / Fever: no  History of liver or kidney disease: no       Review of Systems   Constitutional: Negative. HENT: Negative. Respiratory: Negative. Cardiovascular: Negative. Gastrointestinal: Negative. Musculoskeletal: Negative. Skin: Negative. Allergic/Immunologic: Negative. Prior to Visit Medications    Medication Sig Taking? Authorizing Provider   traZODone (DESYREL) 50 MG tablet TAKE 1-2 TABLETS BY MOUTH NIGHTLY Yes Matthieu Lott MD   omeprazole (PRILOSEC) 40 MG delayed release capsule Take 1 capsule by mouth daily Yes Matthieu Lott MD   montelukast (SINGULAIR) 10 MG tablet Take 1 tablet by mouth nightly Yes Matthieu Lott MD   venlafaxine (EFFEXOR) 37.5 MG tablet Take 37.5 mg by mouth Yes Historical Provider, MD   Dapsone (ACZONE) 7.5 % GEL Apply to face daily.  Yes Charli Méndez MD   vitamin D (ERGOCALCIFEROL) 56604 units CAPS capsule TAKE 1 CAPSULE THREE TIMES A WEEK Yes Wiliam Montes MD   albuterol sulfate HFA (PROAIR HFA) 108 (90 Base) MCG/ACT inhaler Inhale 2 puffs into the lungs every 6 hours as needed for Wheezing Yes Matthieu Lott MD   Multiple Vitamin (ONE-A-DAY ESSENTIAL PO) Take 2 tablets by mouth daily Yes Historical Provider, MD   Calcium Citrate-Vitamin D 200-250 MG-UNIT TABS Take 1 tablet by mouth daily Yes Historical Provider, MD   calcium carbonate (OSCAL) 500 MG TABS tablet Take 500 mg by mouth daily Yes Historical Provider, MD   levonorgestrel (MIRENA) 20 MCG/24HR IUD 1 Each by Intrauterine route once. Yes Historical Provider, MD        Social History     Tobacco Use    Smoking status: Never Smoker    Smokeless tobacco: Never Used   Substance Use Topics    Alcohol use: No     Comment: rarely        Vitals:    04/30/19 1520   BP: 104/70   Site: Right Upper Arm   Position: Sitting   Cuff Size: Small Adult   Pulse: 85   SpO2: 98%   Weight: 162 lb (73.5 kg)   Height: 5' 0.5\" (1.537 m)     Estimated body mass index is 31.12 kg/m² as calculated from the following:    Height as of this encounter: 5' 0.5\" (1.537 m). Weight as of this encounter: 162 lb (73.5 kg). Physical Exam   Constitutional: She is oriented to person, place, and time. Vital signs are normal. She appears well-developed and well-nourished. She is active and cooperative. HENT:   Head: Normocephalic and atraumatic. Right Ear: External ear normal.   Left Ear: External ear normal.   Nose: Nose normal.   Eyes: Conjunctivae are normal. No scleral icterus. Pulmonary/Chest: Effort normal.   Neurological: She is alert and oriented to person, place, and time. No cranial nerve deficit. Coordination and gait normal.   Psychiatric: She has a normal mood and affect. Her speech is normal and behavior is normal. Judgment and thought content normal.   Nursing note and vitals reviewed. ASSESSMENT/PLAN:  1. Travel advice encounter  - MMR vaccine subcutaneous  - Hep A Vaccine Adult (VAQTA)  - Hep B Vaccine Adult (ENGERIX-B)    Advice from [facility] travel health page discussed with the patient today:    [place] country--no special precautions.   travelers visiting rural or remote areas that are served by unregulated water sources (such as private wells) to take special precautions to ensure the safety of their drinking water. Advised traveler to exercise caution during outdoor activities. Important tips include dressing appropriately for the climate (such as loose, lightweight clothing in hot climates and warm layers in cold climates), staying hydrated, avoiding overexposure to the sun, and practicing safe swimming habits. Counseled traveler to be cautious around all animals. Advised traveler could be at risk for injuries from domestic animals such as dogs or cats, even in industrialized countries. The best course of action is to avoid touching, petting, handling, or feeding animals, including pets. Arthropods such as spiders and scorpions can pose a stinging risk, and travelers should exercise care in environments where these creatures are likely to be present. Stressed the urgency of treating suspected and probable rabies infection by:   - Washing the wound immediately with soap and clean water. - Seeking medical attention as soon as possible. Counseled traveler on the risks of diseases associated with the exchange of saliva, blood, vomit, semen, urine, and feces. Travelers should:  - Use a latex condom correctly every time they engage in sex (vaginal, anal, and oral-genital). - Not inject drugs. - Limit alcohol consumption.  - Not have tattoos, piercings, or other procedures that use needles (acupuncture) unless the needles are packaged new or sterilized. - Ensure that medical and dental equipment is sterile or disinfected if seeking care. Traveler advised to plan for how to obtain health care during their trip, should the need arise. Motor vehicle crashes are the #1 killer of healthy [place] citizens in foreign countries. Most recommendations for safe transportation are basic and could be considered common sense. However, travelers often do not think about the importance of being aware and careful when walking, riding, driving, or flying.   travelers to think about transportation options before they arrive, especially if they will be driving in [place]. Some basic reminders reviewed:  - Choose safe vehicles and avoid motorbikes when possible. - Wear a seatbelt or a helmet at all times. - Do not drive after drinking alcohol or ride with someone who has been drinking. Traveler reminded on how to protect their personal safety during travel, regardless of their destination. The [employer] has an extensive website with safety information for international travelers, travel alerts and warnings, and country-specific information. 2. Need for hepatitis A and B vaccination  Vaccine counseling given today on benefits and potential risks of hep A and B vaccines, as well as dosing schedules for each. - Hep A Vaccine Adult (VAQTA)  - Hep B Vaccine Adult (ENGERIX-B)    3. Need for MMR vaccine  - MMR vaccine subcutaneous  Never had 2nd MMR, will give. Discussed she should be immune by the time of her trip. Discussed current measles outbreaks and benefits vs risks of the vaccine. No immune system issues. I spent 15 minutes counseling the patient on the above matters for travel. No follow-ups on file. An electronic signature was used to authenticate this note.     --Kizzie Simmonds, MD on 5/3/2019 at 5:40 AM

## 2019-05-30 ENCOUNTER — E-VISIT (OUTPATIENT)
Dept: FAMILY MEDICINE CLINIC | Age: 39
End: 2019-05-30
Payer: COMMERCIAL

## 2019-05-30 DIAGNOSIS — H10.31 ACUTE CONJUNCTIVITIS OF RIGHT EYE, UNSPECIFIED ACUTE CONJUNCTIVITIS TYPE: Primary | ICD-10-CM

## 2019-05-30 PROBLEM — L72.0 EIC (EPIDERMAL INCLUSION CYST): Status: ACTIVE | Noted: 2018-10-19

## 2019-05-30 PROBLEM — K21.9 GERD (GASTROESOPHAGEAL REFLUX DISEASE): Status: ACTIVE | Noted: 2019-05-30

## 2019-05-30 PROBLEM — H53.9 VISUAL DISTURBANCE: Status: ACTIVE | Noted: 2019-05-30

## 2019-05-30 PROBLEM — R92.8 ABNORMAL MAMMOGRAM OF RIGHT BREAST: Status: ACTIVE | Noted: 2019-01-13

## 2019-05-30 PROCEDURE — 98969 PR NONPHYSICIAN ONLINE ASSESSMENT AND MANAGEMENT: CPT | Performed by: NURSE PRACTITIONER

## 2019-05-30 RX ORDER — DULAGLUTIDE 1.5 MG/.5ML
INJECTION, SOLUTION SUBCUTANEOUS
COMMUNITY
Start: 2019-05-14 | End: 2021-08-01 | Stop reason: ALTCHOICE

## 2019-05-30 RX ORDER — FLUTICASONE PROPIONATE 50 MCG
1-2 SPRAY, SUSPENSION (ML) NASAL DAILY PRN
COMMUNITY

## 2019-05-30 RX ORDER — CALCIUM CARBONATE 300MG(750)
1 TABLET,CHEWABLE ORAL DAILY
COMMUNITY
End: 2021-07-29

## 2019-05-30 RX ORDER — POLYMYXIN B SULFATE AND TRIMETHOPRIM 1; 10000 MG/ML; [USP'U]/ML
1 SOLUTION OPHTHALMIC 4 TIMES DAILY
Qty: 1 BOTTLE | Refills: 0 | Status: SHIPPED | OUTPATIENT
Start: 2019-05-30 | End: 2019-06-04

## 2019-05-30 RX ORDER — DIPHENHYDRAMINE HCL 25 MG
2 CAPSULE ORAL
COMMUNITY
End: 2021-12-21

## 2019-05-30 NOTE — PROGRESS NOTES
Is there anything that you have noticed that makes your eyes worse such as activities you do, places you visit, or products that you use? No   What seems to make your eyes worse? Have you started using new eye care products, such as cosmetics, eye cream, contact solutions or a new OTC eye drop? No   If yes, list    Have you had a similar problem with your eyes in the past? Yes, I have had similar eye symptoms more than once before   When was the last episode? 2017   Was there a diagnosis? Yes   If yes, what was the diagnosis? conjunctivitis   What treatments have worked in the past? antibiotic drops or ointment   What has not worked? N/A   Are you currently using any medications or other treatments for these symptoms? No   If yes, please list the names of any medications or other treatments that you have tried. Are these treatments providing any relief? Is there anything else you would like to share about your eye problem?       Allergies   Allergen Reactions    Codeine Nausea And Vomiting    Elavil [Amitriptyline] Nausea And Vomiting    Tessalon [Benzonatate] Rash     Current Outpatient Medications on File Prior to Visit   Medication Sig Dispense Refill    Ketotifen Fumarate (ZADITOR OP) Apply 1 drop to eye every 12 hours as needed      fluticasone (FLONASE) 50 MCG/ACT nasal spray 1-2 sprays by Nasal route daily as needed      TRULICITY 1.5 DW/8.5SL SOPN       Cholecalciferol (VITAMIN D3) 1000 units CHEW Take 1 tablet by mouth daily      Carboxymethylcellul-Glycerin (REFRESH OPTIVE) 0.5-0.9 % SOLN Apply 1 drop to eye 3 times daily      Multiple Vitamins-Minerals (MULTIVITAMIN ADULT PO) Take 1 capsule by mouth daily      diphenhydrAMINE (BENADRYL ALLERGY) 25 MG capsule Take 2 capsules by mouth every 4-6 hours as needed      traZODone (DESYREL) 50 MG tablet TAKE 1-2 TABLETS BY MOUTH NIGHTLY 60 tablet 5    omeprazole (PRILOSEC) 40 MG delayed release capsule Take 1 capsule by mouth daily 90 capsule 1    montelukast (SINGULAIR) 10 MG tablet Take 1 tablet by mouth nightly 90 tablet 1    venlafaxine (EFFEXOR) 37.5 MG tablet Take 37.5 mg by mouth      Dapsone (ACZONE) 7.5 % GEL Apply to face daily. 90 g 3    vitamin D (ERGOCALCIFEROL) 84497 units CAPS capsule TAKE 1 CAPSULE THREE TIMES A WEEK 36 capsule 5    albuterol sulfate HFA (PROAIR HFA) 108 (90 Base) MCG/ACT inhaler Inhale 2 puffs into the lungs every 6 hours as needed for Wheezing 3 Inhaler 3    Multiple Vitamin (ONE-A-DAY ESSENTIAL PO) Take 2 tablets by mouth daily      Calcium Citrate-Vitamin D 200-250 MG-UNIT TABS Take 1 tablet by mouth daily      calcium carbonate (OSCAL) 500 MG TABS tablet Take 500 mg by mouth daily      levonorgestrel (MIRENA) 20 MCG/24HR IUD 1 Each by Intrauterine route once. No current facility-administered medications on file prior to visit. Patient Active Problem List   Diagnosis    Diabetes mellitus, type II ([facility] [address]    Asthma    Depression    Vitamin D deficiency    Class 1 obesity in adult    Hyperlipidemia    Hip flexor tendinitis    Right upper quadrant abdominal pain    Nausea    Hypertriglyceridemia    Obesity, Class II, BMI 35-39.9, with comorbidity    S/P laparoscopic sleeve gastrectomy    Shoulder impingement    Obesity (BMI 30.0-34. 9)    Right carpal tunnel syndrome    Right tennis elbow    Visual disturbance    GERD (gastroesophageal reflux disease)    EIC (epidermal inclusion cyst)    Abnormal mammogram of right breast     Past Medical History:   Diagnosis Date    Asthma     Depression     ONLY POSTPARTEM    GERD (gastroesophageal reflux disease) 5/30/2019    Hyperlipidemia 3/8/2013    Obesity     Right carpal tunnel syndrome 12/3/2018    Type II or unspecified type diabetes mellitus without mention of complication, not stated as uncontrolled      Physical Exam:  No media submitted for review; E-visit - no in-person Physical Exam Performed    Assessment/Plan:  1. Acute conjunctivitis of right eye, unspecified acute conjunctivitis type  Patient currently using allergy OP drops and taking PO allergy medications. Due to recent exposure, will treat empirically with Polytrim OP drops. May continue other eye drops for underlying allergy symptoms if desired but should wait at least 5 min between instillation of different OP medications and take care to ensure drops are not contaminated. Follow up with PCP if symptoms worsen or persist.  Seek immediate medical attention for ocular pain, change in vision, signs/symptoms of allergic reaction, or other serious concerns. - trimethoprim-polymyxin b (POLYTRIM) 61775-3.1 UNIT/ML-% ophthalmic solution; Place 1 drop into the right eye 4 times daily for 5 days  Dispense: 1 Bottle;  Refill: 0

## 2019-07-02 LAB
CHOLESTEROL, TOTAL: 165 MG/DL
CHOLESTEROL/HDL RATIO: NORMAL
CREATININE URINE: 78.3 MG/DL
HDLC SERPL-MCNC: 51 MG/DL (ref 35–70)
LDL CHOLESTEROL CALCULATED: 96 MG/DL (ref 0–160)
MICROALBUMIN/CREAT 24H UR: <7 MG/G{CREAT}
TRIGL SERPL-MCNC: 90 MG/DL
VLDLC SERPL CALC-MCNC: NORMAL MG/DL

## 2019-07-11 ENCOUNTER — PATIENT MESSAGE (OUTPATIENT)
Dept: FAMILY MEDICINE CLINIC | Age: 39
End: 2019-07-11

## 2019-07-24 ENCOUNTER — TELEPHONE (OUTPATIENT)
Dept: FAMILY MEDICINE CLINIC | Age: 39
End: 2019-07-24

## 2019-07-25 ENCOUNTER — NURSE ONLY (OUTPATIENT)
Dept: FAMILY MEDICINE CLINIC | Age: 39
End: 2019-07-25
Payer: COMMERCIAL

## 2019-07-25 DIAGNOSIS — Z23 NEED FOR HEPATITIS B VACCINATION: Primary | ICD-10-CM

## 2019-07-25 PROCEDURE — 90746 HEPB VACCINE 3 DOSE ADULT IM: CPT | Performed by: FAMILY MEDICINE

## 2019-07-25 PROCEDURE — 90471 IMMUNIZATION ADMIN: CPT | Performed by: FAMILY MEDICINE

## 2019-07-25 NOTE — TELEPHONE ENCOUNTER
Does patient need an appt or just a nurse visit. She wasn't sure which ones she needed off hand. Maybe 2nd hep b.   Please Advise

## 2019-10-14 DIAGNOSIS — K21.9 CHRONIC GERD: ICD-10-CM

## 2019-10-14 RX ORDER — MONTELUKAST SODIUM 10 MG/1
TABLET ORAL
Qty: 90 TABLET | Refills: 0 | Status: SHIPPED | OUTPATIENT
Start: 2019-10-14 | End: 2020-01-10

## 2019-10-14 RX ORDER — OMEPRAZOLE 40 MG/1
CAPSULE, DELAYED RELEASE ORAL
Qty: 90 CAPSULE | Refills: 0 | Status: SHIPPED | OUTPATIENT
Start: 2019-10-14 | End: 2020-01-10

## 2019-10-14 RX ORDER — TRAZODONE HYDROCHLORIDE 50 MG/1
50-100 TABLET ORAL NIGHTLY
Qty: 180 TABLET | Refills: 0 | Status: SHIPPED | OUTPATIENT
Start: 2019-10-14 | End: 2020-01-15

## 2019-11-21 ENCOUNTER — OFFICE VISIT (OUTPATIENT)
Dept: FAMILY MEDICINE CLINIC | Age: 39
End: 2019-11-21
Payer: COMMERCIAL

## 2019-11-21 VITALS
HEART RATE: 88 BPM | DIASTOLIC BLOOD PRESSURE: 78 MMHG | BODY MASS INDEX: 30.78 KG/M2 | WEIGHT: 163 LBS | OXYGEN SATURATION: 97 % | HEIGHT: 61 IN | SYSTOLIC BLOOD PRESSURE: 106 MMHG

## 2019-11-21 DIAGNOSIS — E11.9 TYPE 2 DIABETES MELLITUS WITHOUT COMPLICATION, UNSPECIFIED WHETHER LONG TERM INSULIN USE (HCC): ICD-10-CM

## 2019-11-21 DIAGNOSIS — K43.9 ABDOMINAL WALL HERNIA: ICD-10-CM

## 2019-11-21 DIAGNOSIS — J45.20 MILD INTERMITTENT ASTHMA WITHOUT COMPLICATION: ICD-10-CM

## 2019-11-21 DIAGNOSIS — Z01.818 PREOP GENERAL PHYSICAL EXAM: Primary | ICD-10-CM

## 2019-11-21 DIAGNOSIS — Z41.1 ELECTIVE PROCEDURE FOR UNACCEPTABLE COSMETIC APPEARANCE: ICD-10-CM

## 2019-11-21 DIAGNOSIS — E53.8 VITAMIN B12 DEFICIENCY: ICD-10-CM

## 2019-11-21 LAB
A/G RATIO: 2 (ref 1.1–2.2)
ALBUMIN SERPL-MCNC: 4.5 G/DL (ref 3.4–5)
ALP BLD-CCNC: 56 U/L (ref 40–129)
ALT SERPL-CCNC: 10 U/L (ref 10–40)
ANION GAP SERPL CALCULATED.3IONS-SCNC: 11 MMOL/L (ref 3–16)
AST SERPL-CCNC: 13 U/L (ref 15–37)
BASOPHILS ABSOLUTE: 0 K/UL (ref 0–0.2)
BASOPHILS RELATIVE PERCENT: 0.5 %
BILIRUB SERPL-MCNC: 0.5 MG/DL (ref 0–1)
BUN BLDV-MCNC: 6 MG/DL (ref 7–20)
CALCIUM SERPL-MCNC: 9.1 MG/DL (ref 8.3–10.6)
CHLORIDE BLD-SCNC: 102 MMOL/L (ref 99–110)
CO2: 27 MMOL/L (ref 21–32)
CREAT SERPL-MCNC: 0.5 MG/DL (ref 0.6–1.1)
EOSINOPHILS ABSOLUTE: 0.1 K/UL (ref 0–0.6)
EOSINOPHILS RELATIVE PERCENT: 1.5 %
GFR AFRICAN AMERICAN: >60
GFR NON-AFRICAN AMERICAN: >60
GLOBULIN: 2.3 G/DL
GLUCOSE BLD-MCNC: 84 MG/DL (ref 70–99)
HCT VFR BLD CALC: 41.1 % (ref 36–48)
HEMOGLOBIN: 14.2 G/DL (ref 12–16)
LYMPHOCYTES ABSOLUTE: 2.5 K/UL (ref 1–5.1)
LYMPHOCYTES RELATIVE PERCENT: 36.2 %
MCH RBC QN AUTO: 32 PG (ref 26–34)
MCHC RBC AUTO-ENTMCNC: 34.7 G/DL (ref 31–36)
MCV RBC AUTO: 92.4 FL (ref 80–100)
MONOCYTES ABSOLUTE: 0.4 K/UL (ref 0–1.3)
MONOCYTES RELATIVE PERCENT: 6 %
NEUTROPHILS ABSOLUTE: 3.9 K/UL (ref 1.7–7.7)
NEUTROPHILS RELATIVE PERCENT: 55.8 %
PDW BLD-RTO: 12.5 % (ref 12.4–15.4)
PLATELET # BLD: 205 K/UL (ref 135–450)
PMV BLD AUTO: 9 FL (ref 5–10.5)
POTASSIUM SERPL-SCNC: 4 MMOL/L (ref 3.5–5.1)
RBC # BLD: 4.44 M/UL (ref 4–5.2)
SODIUM BLD-SCNC: 140 MMOL/L (ref 136–145)
TOTAL PROTEIN: 6.8 G/DL (ref 6.4–8.2)
VITAMIN B-12: >2000 PG/ML (ref 211–911)
WBC # BLD: 6.9 K/UL (ref 4–11)

## 2019-11-21 PROCEDURE — 99242 OFF/OP CONSLTJ NEW/EST SF 20: CPT | Performed by: FAMILY MEDICINE

## 2019-11-22 LAB
ESTIMATED AVERAGE GLUCOSE: 82.5 MG/DL
HBA1C MFR BLD: 4.5 %

## 2019-12-30 DIAGNOSIS — R92.8 ABNORMALITY OF RIGHT BREAST ON SCREENING MAMMOGRAM: Primary | ICD-10-CM

## 2020-01-10 RX ORDER — OMEPRAZOLE 40 MG/1
CAPSULE, DELAYED RELEASE ORAL
Qty: 90 CAPSULE | Refills: 0 | Status: SHIPPED | OUTPATIENT
Start: 2020-01-10

## 2020-01-15 RX ORDER — TRAZODONE HYDROCHLORIDE 50 MG/1
TABLET ORAL
Qty: 180 TABLET | Refills: 2 | Status: SHIPPED | OUTPATIENT
Start: 2020-01-15 | End: 2021-01-15 | Stop reason: SDUPTHER

## 2020-12-03 LAB
CREATININE URINE POCT: 123.3
MICROALBUMIN/CREAT 24H UR: 7.9 MG/G{CREAT}
MICROALBUMIN/CREAT UR-RTO: 6.4

## 2020-12-07 LAB
AVERAGE GLUCOSE: NORMAL
CHOLESTEROL, TOTAL: 166 MG/DL
CHOLESTEROL/HDL RATIO: NORMAL
HBA1C MFR BLD: 5.5 %
HDLC SERPL-MCNC: 46 MG/DL (ref 35–70)
LDL CHOLESTEROL CALCULATED: 89 MG/DL (ref 0–160)
NONHDLC SERPL-MCNC: NORMAL MG/DL
TRIGL SERPL-MCNC: 157 MG/DL
VLDLC SERPL CALC-MCNC: NORMAL MG/DL

## 2021-03-15 ENCOUNTER — TELEPHONE (OUTPATIENT)
Dept: BARIATRICS/WEIGHT MGMT | Age: 41
End: 2021-03-15

## 2021-03-15 NOTE — LETTER
Octaviano Gonsalez MD  [facility]  [address], [address] [facility], [address] [facility]  [phone]  Phone  [phone]  Fax    Luis Larry,    We noticed that you missed your last appointment. We are interested in your progress and how you have been feeling! As you know, it is important to complete regular follow-up visits to monitor your health after surgery and to insure the best success of your procedure. Please call the office today at [phone] to schedule an appointment. We look forward to seeing you!       Octaviano Gonsalez MD  [facility]  [address], [address] [facility], [address] [facility]  [phone]  Phone  [phone]  Fax

## 2021-07-29 ENCOUNTER — TELEMEDICINE (OUTPATIENT)
Dept: FAMILY MEDICINE CLINIC | Age: 41
End: 2021-07-29
Payer: COMMERCIAL

## 2021-07-29 DIAGNOSIS — E66.09 CLASS 1 OBESITY DUE TO EXCESS CALORIES WITH SERIOUS COMORBIDITY AND BODY MASS INDEX (BMI) OF 31.0 TO 31.9 IN ADULT: ICD-10-CM

## 2021-07-29 DIAGNOSIS — E11.9 TYPE 2 DIABETES MELLITUS WITHOUT COMPLICATION, UNSPECIFIED WHETHER LONG TERM INSULIN USE (HCC): ICD-10-CM

## 2021-07-29 DIAGNOSIS — G47.00 INSOMNIA, UNSPECIFIED TYPE: Primary | ICD-10-CM

## 2021-07-29 DIAGNOSIS — F41.9 ANXIETY: ICD-10-CM

## 2021-07-29 DIAGNOSIS — J30.9 ALLERGIC RHINITIS, UNSPECIFIED SEASONALITY, UNSPECIFIED TRIGGER: ICD-10-CM

## 2021-07-29 DIAGNOSIS — J45.30 MILD PERSISTENT ASTHMA WITHOUT COMPLICATION: ICD-10-CM

## 2021-07-29 PROCEDURE — 99214 OFFICE O/P EST MOD 30 MIN: CPT | Performed by: FAMILY MEDICINE

## 2021-07-29 RX ORDER — LEVOCETIRIZINE DIHYDROCHLORIDE 5 MG/1
1 TABLET, FILM COATED ORAL DAILY
COMMUNITY
Start: 2021-06-20

## 2021-07-29 RX ORDER — TRIAMCINOLONE ACETONIDE 55 UG/1
1 SPRAY, METERED NASAL 2 TIMES DAILY
COMMUNITY
Start: 2021-04-22

## 2021-07-29 RX ORDER — ESTRADIOL 0.5 MG/1
1 TABLET ORAL DAILY
COMMUNITY
Start: 2021-07-26

## 2021-07-29 RX ORDER — TRAZODONE HYDROCHLORIDE 50 MG/1
TABLET ORAL
Qty: 180 TABLET | Refills: 0 | Status: CANCELLED | OUTPATIENT
Start: 2021-07-29

## 2021-07-29 SDOH — ECONOMIC STABILITY: FOOD INSECURITY: WITHIN THE PAST 12 MONTHS, YOU WORRIED THAT YOUR FOOD WOULD RUN OUT BEFORE YOU GOT MONEY TO BUY MORE.: NEVER TRUE

## 2021-07-29 SDOH — ECONOMIC STABILITY: FOOD INSECURITY: WITHIN THE PAST 12 MONTHS, THE FOOD YOU BOUGHT JUST DIDN'T LAST AND YOU DIDN'T HAVE MONEY TO GET MORE.: NEVER TRUE

## 2021-07-29 SDOH — ECONOMIC STABILITY: TRANSPORTATION INSECURITY
IN THE PAST 12 MONTHS, HAS LACK OF TRANSPORTATION KEPT YOU FROM MEETINGS, WORK, OR FROM GETTING THINGS NEEDED FOR DAILY LIVING?: NO

## 2021-07-29 SDOH — ECONOMIC STABILITY: TRANSPORTATION INSECURITY
IN THE PAST 12 MONTHS, HAS THE LACK OF TRANSPORTATION KEPT YOU FROM MEDICAL APPOINTMENTS OR FROM GETTING MEDICATIONS?: NO

## 2021-07-29 ASSESSMENT — SOCIAL DETERMINANTS OF HEALTH (SDOH): HOW HARD IS IT FOR YOU TO PAY FOR THE VERY BASICS LIKE FOOD, HOUSING, MEDICAL CARE, AND HEATING?: NOT HARD AT ALL

## 2021-08-01 RX ORDER — DULAGLUTIDE 3 MG/.5ML
3 INJECTION, SOLUTION SUBCUTANEOUS WEEKLY
Qty: 4 PEN | Refills: 5 | Status: SHIPPED | OUTPATIENT
Start: 2021-08-01 | End: 2022-04-06 | Stop reason: SDUPTHER

## 2021-08-01 RX ORDER — VENLAFAXINE 100 MG/1
100 TABLET ORAL 2 TIMES DAILY
Qty: 90 TABLET | Refills: 3 | Status: SHIPPED | OUTPATIENT
Start: 2021-08-01 | End: 2021-09-13

## 2021-08-01 RX ORDER — MONTELUKAST SODIUM 10 MG/1
TABLET ORAL
Qty: 90 TABLET | Refills: 1 | Status: SHIPPED | OUTPATIENT
Start: 2021-08-01 | End: 2022-02-07

## 2021-08-01 RX ORDER — ALBUTEROL SULFATE 90 UG/1
2 AEROSOL, METERED RESPIRATORY (INHALATION) EVERY 6 HOURS PRN
Qty: 3 INHALER | Refills: 3 | Status: SHIPPED | OUTPATIENT
Start: 2021-08-01 | End: 2022-08-15

## 2021-08-01 RX ORDER — RAMELTEON 8 MG/1
8 TABLET ORAL NIGHTLY PRN
Qty: 30 TABLET | Refills: 3 | Status: SHIPPED | OUTPATIENT
Start: 2021-08-01 | End: 2021-08-27

## 2021-08-02 ENCOUNTER — TELEPHONE (OUTPATIENT)
Dept: FAMILY MEDICINE CLINIC | Age: 41
End: 2021-08-02

## 2021-08-26 DIAGNOSIS — G47.00 INSOMNIA, UNSPECIFIED TYPE: ICD-10-CM

## 2021-08-26 NOTE — TELEPHONE ENCOUNTER
.  Last office visit 7/29/2021     Last written 8/1/21 30 with 3      Next office visit scheduled Visit date not found    Requested Prescriptions     Pending Prescriptions Disp Refills    ramelteon (ROZEREM) 8 MG tablet [Pharmacy Med Name: RAMELTEON 8 MG TABLET] 30 tablet 3     Sig: TAKE 1 TABLET BY MOUTH EVERY DAY AT BEDTIME AS NEEDED FOR SLEEP

## 2021-08-27 RX ORDER — RAMELTEON 8 MG/1
TABLET ORAL
Qty: 30 TABLET | Refills: 3 | Status: SHIPPED | OUTPATIENT
Start: 2021-08-27 | End: 2021-12-21

## 2021-09-10 DIAGNOSIS — F41.9 ANXIETY: ICD-10-CM

## 2021-09-10 NOTE — TELEPHONE ENCOUNTER
.  Last office visit 7/29/2021     Last written 8/1/21 90 with 3      Next office visit scheduled Visit date not found    Requested Prescriptions     Pending Prescriptions Disp Refills    venlafaxine (EFFEXOR) 100 MG tablet [Pharmacy Med Name: VENLAFAXINE  MG TABLET] 90 tablet 3     Sig: TAKE 1 TABLET BY MOUTH TWICE A DAY

## 2021-09-13 RX ORDER — VENLAFAXINE 100 MG/1
TABLET ORAL
Qty: 90 TABLET | Refills: 3 | Status: SHIPPED | OUTPATIENT
Start: 2021-09-13 | End: 2022-03-16

## 2021-12-21 ENCOUNTER — OFFICE VISIT (OUTPATIENT)
Dept: FAMILY MEDICINE CLINIC | Age: 41
End: 2021-12-21
Payer: COMMERCIAL

## 2021-12-21 VITALS
TEMPERATURE: 98.4 F | HEART RATE: 101 BPM | OXYGEN SATURATION: 98 % | DIASTOLIC BLOOD PRESSURE: 84 MMHG | SYSTOLIC BLOOD PRESSURE: 126 MMHG | HEIGHT: 60 IN | WEIGHT: 183 LBS | BODY MASS INDEX: 35.93 KG/M2

## 2021-12-21 DIAGNOSIS — F51.01 PRIMARY INSOMNIA: ICD-10-CM

## 2021-12-21 DIAGNOSIS — E11.9 TYPE 2 DIABETES MELLITUS WITHOUT COMPLICATION, UNSPECIFIED WHETHER LONG TERM INSULIN USE (HCC): ICD-10-CM

## 2021-12-21 DIAGNOSIS — Z01.818 PRE-OPERATIVE EXAMINATION: Primary | ICD-10-CM

## 2021-12-21 DIAGNOSIS — N93.9 ABNORMAL UTERINE BLEEDING: ICD-10-CM

## 2021-12-21 DIAGNOSIS — J45.30 MILD PERSISTENT ASTHMA WITHOUT COMPLICATION: ICD-10-CM

## 2021-12-21 PROCEDURE — 99214 OFFICE O/P EST MOD 30 MIN: CPT | Performed by: PHYSICIAN ASSISTANT

## 2021-12-21 RX ORDER — QUETIAPINE FUMARATE 50 MG/1
50 TABLET, FILM COATED ORAL NIGHTLY PRN
Qty: 30 TABLET | Refills: 3 | Status: SHIPPED | OUTPATIENT
Start: 2021-12-21 | End: 2022-01-14

## 2021-12-21 NOTE — PROGRESS NOTES
Preoperative Consultation      Elvira Moreira  YOB: 1980    Date of Service:  12/21/2021    Vitals:    12/21/21 0804   BP: 126/84   Site: Right Upper Arm   Position: Sitting   Cuff Size: Medium Adult   Pulse: 101   Temp: 98.4 °F (36.9 °C)   TempSrc: Oral   SpO2: 98%   Weight: 183 lb (83 kg)   Height: 4' 11.5\" (1.511 m)      Wt Readings from Last 2 Encounters:   12/21/21 183 lb (83 kg)   11/21/19 163 lb (73.9 kg)     BP Readings from Last 3 Encounters:   12/21/21 126/84   11/21/19 106/78   04/30/19 104/70        Chief Complaint   Patient presents with    Pre-op Exam     Hysterectomy - Dr. Dorene Fishman - DOS 12/30/2021      Allergies   Allergen Reactions    Codeine Nausea And Vomiting    Elavil [Amitriptyline] Nausea And Vomiting    Tessalon [Benzonatate] Rash     Outpatient Medications Marked as Taking for the 12/21/21 encounter (Office Visit) with WALTER Chang   Medication Sig Dispense Refill    venlafaxine (EFFEXOR) 100 MG tablet TAKE 1 TABLET BY MOUTH TWICE A DAY 90 tablet 3    ramelteon (ROZEREM) 8 MG tablet TAKE 1 TABLET BY MOUTH EVERY DAY AT BEDTIME AS NEEDED FOR SLEEP 30 tablet 3    montelukast (SINGULAIR) 10 MG tablet TAKE 1 TABLET BY MOUTH EVERY DAY EVERY NIGHT 90 tablet 1    albuterol sulfate HFA (PROAIR HFA) 108 (90 Base) MCG/ACT inhaler Inhale 2 puffs into the lungs every 6 hours as needed for Wheezing 3 Inhaler 3    Dulaglutide (TRULICITY) 3 IL/4.2DM SOPN Inject 3 mg into the skin once a week 4 pen 5    estradiol (ESTRACE) 0.5 MG tablet Take 1 tablet by mouth daily      levocetirizine (XYZAL) 5 MG tablet Take 1 tablet by mouth daily      triamcinolone (NASACORT) 55 MCG/ACT nasal inhaler 1 spray by Nasal route 2 times daily      omeprazole (PRILOSEC) 40 MG delayed release capsule TAKE 1 CAPSULE BY MOUTH EVERY DAY 90 capsule 0    fluticasone (FLONASE) 50 MCG/ACT nasal spray 1-2 sprays by Nasal route daily as needed      Multiple Vitamins-Minerals (MULTIVITAMIN ADULT PO) Take 1 capsule by mouth daily      diphenhydrAMINE (BENADRYL ALLERGY) 25 MG capsule Take 2 capsules by mouth every 4-6 hours as needed      Calcium Citrate-Vitamin D 200-250 MG-UNIT TABS Take 1 tablet by mouth daily      calcium carbonate (OSCAL) 500 MG TABS tablet Take 500 mg by mouth daily      levonorgestrel (MIRENA) 20 MCG/24HR IUD 1 Each by Intrauterine route once. This patient presents to the office today for a preoperative consultation at the request of surgeon, Dr. Junior Linares, who plans on performing Hysterectomy on December 30 at [facility].  The current problem began 12 months ago, and symptoms have been unchanged with time. Conservative therapy: Yes: IUD, which has been ineffective. .    Planned anesthesia: General   Known anesthesia problems: None   Bleeding risk: No recent or remote history of abnormal bleeding  Personal or FH of DVT/PE: No    Patient objection to receiving blood products: No    Patient Active Problem List   Diagnosis    [facility], [facility]    Asthma    Depression    Vitamin D deficiency    Class 1 obesity in adult    Hyperlipidemia    Hip flexor tendinitis    Right upper quadrant abdominal pain    Nausea    Hypertriglyceridemia    Obesity, Class II, BMI 35-39.9, with comorbidity    S/P laparoscopic sleeve gastrectomy    Shoulder impingement    Obesity (BMI 30.0-34. 9)    Right carpal tunnel syndrome    Right tennis elbow    Visual disturbance    GERD (gastroesophageal reflux disease)    EIC (epidermal inclusion cyst)    Abnormal mammogram of right breast       Past Medical History:   Diagnosis Date    Asthma     Depression     ONLY POSTPARTEM    GERD (gastroesophageal reflux disease) 5/30/2019    Hyperlipidemia 3/8/2013    Obesity     Right carpal tunnel syndrome 12/3/2018    Type II or unspecified type diabetes mellitus without mention of complication, not stated as uncontrolled      Past Surgical History:   Procedure Laterality Date    BREAST SURGERY      MILK DUCT     SECTION  , 3/04,     LASIK      SLEEVE GASTRECTOMY  10/21/15    laproscopic    TONSILLECTOMY  1989    UPPER GASTROINTESTINAL ENDOSCOPY  8/19/15     Family History   Problem Relation Age of Onset    High Blood Pressure Mother     High Cholesterol Father     Diabetes Father     Cancer Father         melanoma, shoulder    Stroke Maternal Grandmother     Diabetes Maternal Grandmother     Vision Loss Maternal Grandmother     Diabetes Paternal Grandfather     Arthritis Paternal Grandfather      Social History     Socioeconomic History    Marital status:      Spouse name: Not on file    Number of children: Not on file    Years of education: Not on file    Highest education level: Not on file   Occupational History    Not on file   Tobacco Use    Smoking status: Never Smoker    Smokeless tobacco: Never Used   Vaping Use    Vaping Use: Never used   Substance and Sexual Activity    Alcohol use: No     Comment: rarely    Drug use: No    Sexual activity: Yes     Partners: Male     Comment: mirena and vasectomy   Other Topics Concern    Not on file   Social History Narrative    Not on file     Social Determinants of Health     Financial Resource Strain: Low Risk     Difficulty of Paying Living Expenses: Not hard at all   Food Insecurity: No Food Insecurity    Worried About Running Out of Food in the Last Year: Never true    Iva of Food in the Last Year: Never true   Transportation Needs: No Transportation Needs    Lack of Transportation (Medical): No    Lack of Transportation (Non-Medical):  No   Physical Activity:     Days of Exercise per Week: Not on file    Minutes of Exercise per Session: Not on file   Stress:     Feeling of Stress : Not on file   Social Connections:     Frequency of Communication with Friends and Family: Not on file    Frequency of Social Gatherings with Friends and Family: Not on file    Attends Mu-ism Services: Not on file    Active Member of Clubs or Organizations: Not on file    Attends Club or Organization Meetings: Not on file    Marital Status: Not on file   Intimate Partner Violence:     Fear of Current or Ex-Partner: Not on file    Emotionally Abused: Not on file    Physically Abused: Not on file    Sexually Abused: Not on file   Housing Stability:     Unable to Pay for Housing in the Last Year: Not on file    Number of [place] in the Last Year: Not on file    Unstable Housing in the Last Year: Not on file       Review of Systems  A comprehensive review of systems was negative except for what was noted in the HPI. Physical Exam   Constitutional: She is oriented to person, place, and time. She appears well-developed and well-nourished. No distress. HENT:   Head: Normocephalic and atraumatic. Eyes: Conjunctivae and EOM are normal. Pupils are equal, round, and reactive to light. Neck: Trachea normal and normal range of motion. Neck supple. No mass and no thyromegaly present. Cardiovascular: Normal rate, regular rhythm, normal heart sounds and intact distal pulses. Exam reveals no gallop and no friction rub. No murmur heard. Pulmonary/Chest: Effort normal and breath sounds normal. No respiratory distress. She has no wheezes. She has no rales. Abdominal: Soft. Normal aorta and bowel sounds are normal. She exhibits no distension and no mass. There is no hepatosplenomegaly. No tenderness. Neurological: She is alert and oriented to person, place, and time. She has normal strength. No cranial nerve deficit or sensory deficit. Coordination and gait normal.   Skin: Skin is warm and dry. No rash noted. No erythema. Psychiatric: She has a normal mood and affect. Her behavior is normal.  reports insomnia.       Lab Review   Lab Results   Component Value Date     12/21/2021    K 4.0 12/21/2021    CL 98 12/21/2021    CO2 22 12/21/2021    BUN 8 12/21/2021    CREATININE 0.7 12/21/2021 GLUCOSE 281 12/21/2021    CALCIUM 8.6 12/21/2021     Lab Results   Component Value Date    WBC 10.2 12/21/2021    HGB 11.7 12/21/2021    HCT 36.7 12/21/2021    MCV 80.9 12/21/2021     12/21/2021           Assessment:       35 y.o. patient with planned surgery as above. Known risk factors for perioperative complications: Asthma, Diabetes mellitus, insomnia  Current medications which may produce withdrawal symptoms if withheld perioperatively: none      Plan:     1. Preoperative workup as follows: hemoglobin, hematocrit, electrolytes, creatinine  2. Change in medication regimen before surgery: Hold all medications on morning of surgery  3. Prophylaxis for cardiac events with perioperative beta-blockers: Not indicated  ACC/AHA indications for pre-operative beta-blocker use:    · Vascular surgery with history of postitive stress test  · Intermediate or high risk surgery with history of CAD   · Intermediate or high risk surgery with multiple clinical predictors of CAD- 2 of the following: history of compensated or prior heart failure, history of cerebrovascular disease, DM, or renal insufficiency    Routine administration of higher-dose, long-acting metoprolol in beta-blocker-naïve patients on the day of surgery, and in the absence of dose titration is associated with an overall increase in mortality. Beta-blockers should be started days to weeks prior to surgery and titrated to pulse < 70.  4. Deep vein thrombosis prophylaxis: regimen to be chosen by surgical team  5. No contraindications to planned surgery.

## 2021-12-22 RX ORDER — GLUCOSAMINE HCL/CHONDROITIN SU 500-400 MG
CAPSULE ORAL
Qty: 100 STRIP | Refills: 3 | Status: SHIPPED | OUTPATIENT
Start: 2021-12-22

## 2022-01-13 DIAGNOSIS — F51.01 PRIMARY INSOMNIA: ICD-10-CM

## 2022-01-13 NOTE — TELEPHONE ENCOUNTER
Refill Request     Last Seen: Last Seen Department: 12/21/2021  Last Seen by PCP: 12/21/2021    Last Written: 1/21/21 30 tablet 3 refill     Next Appointment:       Message to [email or web address] to schedule appointment. Requested Prescriptions     Pending Prescriptions Disp Refills    QUEtiapine (SEROQUEL) 50 MG tablet [Pharmacy Med Name: QUETIAPINE FUMARATE 50 MG TAB] 30 tablet 3     Sig: TAKE 1 TABLET BY MOUTH NIGHTLY AS NEEDED (INSOMNIA).

## 2022-01-14 RX ORDER — QUETIAPINE FUMARATE 50 MG/1
50 TABLET, FILM COATED ORAL NIGHTLY PRN
Qty: 30 TABLET | Refills: 3 | Status: SHIPPED | OUTPATIENT
Start: 2022-01-14 | End: 2022-04-11

## 2022-02-07 DIAGNOSIS — J45.30 MILD PERSISTENT ASTHMA WITHOUT COMPLICATION: ICD-10-CM

## 2022-02-07 DIAGNOSIS — J30.9 ALLERGIC RHINITIS, UNSPECIFIED SEASONALITY, UNSPECIFIED TRIGGER: ICD-10-CM

## 2022-02-07 RX ORDER — MONTELUKAST SODIUM 10 MG/1
TABLET ORAL
Qty: 90 TABLET | Refills: 1 | Status: SHIPPED | OUTPATIENT
Start: 2022-02-07 | End: 2022-05-31 | Stop reason: SDUPTHER

## 2022-02-07 NOTE — TELEPHONE ENCOUNTER
.  Refill Request     Last Seen: Last Seen Department: 12/21/2021  Last Seen by PCP: 7/29/2021    Last Written: 8-1-21 90 with 1     Next Appointment:   No future appointments.       Requested Prescriptions     Pending Prescriptions Disp Refills    montelukast (SINGULAIR) 10 MG tablet [Pharmacy Med Name: MONTELUKAST SOD 10 MG TABLET] 90 tablet 1     Sig: TAKE 1 TABLET BY MOUTH EVERY DAY EVERY NIGHT

## 2022-03-14 DIAGNOSIS — F41.9 ANXIETY: ICD-10-CM

## 2022-03-14 NOTE — TELEPHONE ENCOUNTER
Refill Request     Last Seen: Last Seen Department: 12/21/2021  Last Seen by PCP: 7/29/2021    Last Written: 09/13/2021 90 Tablet 3 Refills    Next Appointment:   Future Appointments   Date Time Provider Bong Jaclyn   4/21/2022  3:45 PM MD BLANCA Mario  [place] [facility]       Future appointment scheduled      Requested Prescriptions     Pending Prescriptions Disp Refills    venlafaxine (EFFEXOR) 100 MG tablet [Pharmacy Med Name: VENLAFAXINE  MG TABLET] 180 tablet 1     Sig: TAKE 1 TABLET BY MOUTH TWICE A DAY

## 2022-03-16 RX ORDER — VENLAFAXINE 100 MG/1
TABLET ORAL
Qty: 180 TABLET | Refills: 0 | Status: SHIPPED | OUTPATIENT
Start: 2022-03-16 | End: 2022-05-25

## 2022-04-05 DIAGNOSIS — E66.09 CLASS 1 OBESITY DUE TO EXCESS CALORIES WITH SERIOUS COMORBIDITY AND BODY MASS INDEX (BMI) OF 31.0 TO 31.9 IN ADULT: ICD-10-CM

## 2022-04-05 DIAGNOSIS — E11.9 TYPE 2 DIABETES MELLITUS WITHOUT COMPLICATION, UNSPECIFIED WHETHER LONG TERM INSULIN USE (HCC): ICD-10-CM

## 2022-04-05 NOTE — TELEPHONE ENCOUNTER
Refill Request     Last Seen: Last Seen Department: 12/21/2021  Last Seen by PCP: 7/29/2021    Last Written: 8/1/21    Next Appointment:   Future Appointments   Date Time Provider Bong Anaya   4/21/2022  3:45 PM MD BLANCA Hammer  [place] [facility]       Future appointment scheduled      Requested Prescriptions     Pending Prescriptions Disp Refills    Dulaglutide (TRULICITY) 3 BN/8.6PR SOPN 4 pen 5     Sig: Inject 3 mg into the skin once a week

## 2022-04-06 ENCOUNTER — TELEPHONE (OUTPATIENT)
Dept: FAMILY MEDICINE CLINIC | Age: 42
End: 2022-04-06

## 2022-04-06 RX ORDER — DULAGLUTIDE 3 MG/.5ML
3 INJECTION, SOLUTION SUBCUTANEOUS WEEKLY
Qty: 4 PEN | Refills: 5 | Status: SHIPPED | OUTPATIENT
Start: 2022-04-06 | End: 2022-04-21

## 2022-04-06 NOTE — TELEPHONE ENCOUNTER
PA needed for TRULICITY 4OL/6.2FN PEN-INJECTORS    KEY: [id number]  Diagnosis Association: Class 1 obesity due to excess calories with serious comorbidity and body mass index (BMI) of 31.0 to 31.9 in adult (E66.09 , Z68.31);  Type 2 diabetes mellitus without complication, unspecified whether long term insulin use (HCC) (E11.9)

## 2022-04-06 NOTE — TELEPHONE ENCOUNTER
Submitted PA for Trulicity 3SX/1.3OZ pen-injectors, Key: [id number]. Medication has been APPROVED through 04/05/2025. Please notify patient. Thank you.

## 2022-04-11 DIAGNOSIS — F51.01 PRIMARY INSOMNIA: ICD-10-CM

## 2022-04-11 RX ORDER — QUETIAPINE FUMARATE 50 MG/1
50 TABLET, FILM COATED ORAL NIGHTLY PRN
Qty: 90 TABLET | Refills: 1 | Status: SHIPPED | OUTPATIENT
Start: 2022-04-11 | End: 2022-10-19

## 2022-04-11 NOTE — TELEPHONE ENCOUNTER
Refill Request     Last Seen: Last Seen Department: 12/21/2021  Last Seen by PCP: 7/29/2021    Last Written: 1/14/22 with 3 refills     Next Appointment:   Future Appointments   Date Time Provider Bong Anaya   4/21/2022  3:45 PM MD BLANCA Hernandez  [place] [facility]       Future appointment scheduled      Requested Prescriptions     Pending Prescriptions Disp Refills    QUEtiapine (SEROQUEL) 50 MG tablet [Pharmacy Med Name: QUETIAPINE FUMARATE 50 MG TAB] 90 tablet 1     Sig: TAKE 1 TABLET BY MOUTH NIGHTLY AS NEEDED (INSOMNIA).

## 2022-04-21 ENCOUNTER — OFFICE VISIT (OUTPATIENT)
Dept: FAMILY MEDICINE CLINIC | Age: 42
End: 2022-04-21
Payer: COMMERCIAL

## 2022-04-21 VITALS
HEART RATE: 102 BPM | OXYGEN SATURATION: 98 % | SYSTOLIC BLOOD PRESSURE: 122 MMHG | WEIGHT: 190 LBS | BODY MASS INDEX: 37.73 KG/M2 | DIASTOLIC BLOOD PRESSURE: 82 MMHG

## 2022-04-21 DIAGNOSIS — K21.9 CHRONIC GERD: ICD-10-CM

## 2022-04-21 DIAGNOSIS — E11.9 TYPE 2 DIABETES MELLITUS WITHOUT COMPLICATION, UNSPECIFIED WHETHER LONG TERM INSULIN USE (HCC): Primary | ICD-10-CM

## 2022-04-21 DIAGNOSIS — M79.644 PAIN OF RIGHT THUMB: ICD-10-CM

## 2022-04-21 DIAGNOSIS — Z23 NEED FOR PNEUMOCOCCAL VACCINATION: ICD-10-CM

## 2022-04-21 DIAGNOSIS — R41.840 ATTENTION DEFICIT: ICD-10-CM

## 2022-04-21 DIAGNOSIS — Z23 NEED FOR HPV VACCINATION: ICD-10-CM

## 2022-04-21 DIAGNOSIS — Z23 NEED FOR TDAP VACCINATION: ICD-10-CM

## 2022-04-21 LAB — HBA1C MFR BLD: 6.9 %

## 2022-04-21 PROCEDURE — 3044F HG A1C LEVEL LT 7.0%: CPT | Performed by: FAMILY MEDICINE

## 2022-04-21 PROCEDURE — 99214 OFFICE O/P EST MOD 30 MIN: CPT | Performed by: FAMILY MEDICINE

## 2022-04-21 PROCEDURE — 82044 UR ALBUMIN SEMIQUANTITATIVE: CPT | Performed by: FAMILY MEDICINE

## 2022-04-21 PROCEDURE — 90715 TDAP VACCINE 7 YRS/> IM: CPT | Performed by: FAMILY MEDICINE

## 2022-04-21 PROCEDURE — 90677 PCV20 VACCINE IM: CPT | Performed by: FAMILY MEDICINE

## 2022-04-21 PROCEDURE — 83036 HEMOGLOBIN GLYCOSYLATED A1C: CPT | Performed by: FAMILY MEDICINE

## 2022-04-21 PROCEDURE — 90471 IMMUNIZATION ADMIN: CPT | Performed by: FAMILY MEDICINE

## 2022-04-21 PROCEDURE — 90472 IMMUNIZATION ADMIN EACH ADD: CPT | Performed by: FAMILY MEDICINE

## 2022-04-21 PROCEDURE — 90651 9VHPV VACCINE 2/3 DOSE IM: CPT | Performed by: FAMILY MEDICINE

## 2022-04-21 RX ORDER — FLASH GLUCOSE SCANNING READER
1 EACH MISCELLANEOUS ONCE
Qty: 1 EACH | Refills: 0 | Status: SHIPPED | OUTPATIENT
Start: 2022-04-21 | End: 2022-04-21

## 2022-04-21 RX ORDER — PANTOPRAZOLE SODIUM 40 MG/1
40 TABLET, DELAYED RELEASE ORAL 2 TIMES DAILY
Qty: 60 TABLET | Refills: 3 | Status: SHIPPED | OUTPATIENT
Start: 2022-04-21 | End: 2022-05-17

## 2022-04-21 RX ORDER — SEMAGLUTIDE 1.34 MG/ML
1 INJECTION, SOLUTION SUBCUTANEOUS WEEKLY
Qty: 3 ML | Refills: 5 | Status: SHIPPED | OUTPATIENT
Start: 2022-04-21

## 2022-04-21 RX ORDER — FLASH GLUCOSE SENSOR
1 KIT MISCELLANEOUS
Qty: 2 EACH | Refills: 11 | Status: SHIPPED | OUTPATIENT
Start: 2022-04-21

## 2022-04-21 ASSESSMENT — PATIENT HEALTH QUESTIONNAIRE - PHQ9
SUM OF ALL RESPONSES TO PHQ QUESTIONS 1-9: 9
9. THOUGHTS THAT YOU WOULD BE BETTER OFF DEAD, OR OF HURTING YOURSELF: 0
7. TROUBLE CONCENTRATING ON THINGS, SUCH AS READING THE NEWSPAPER OR WATCHING TELEVISION: 3
SUM OF ALL RESPONSES TO PHQ QUESTIONS 1-9: 9
SUM OF ALL RESPONSES TO PHQ QUESTIONS 1-9: 9
5. POOR APPETITE OR OVEREATING: 3
10. IF YOU CHECKED OFF ANY PROBLEMS, HOW DIFFICULT HAVE THESE PROBLEMS MADE IT FOR YOU TO DO YOUR WORK, TAKE CARE OF THINGS AT HOME, OR GET ALONG WITH OTHER PEOPLE: 1
1. LITTLE INTEREST OR PLEASURE IN DOING THINGS: 0
3. TROUBLE FALLING OR STAYING ASLEEP: 1
4. FEELING TIRED OR HAVING LITTLE ENERGY: 1
6. FEELING BAD ABOUT YOURSELF - OR THAT YOU ARE A FAILURE OR HAVE LET YOURSELF OR YOUR FAMILY DOWN: 0
8. MOVING OR SPEAKING SO SLOWLY THAT OTHER PEOPLE COULD HAVE NOTICED. OR THE OPPOSITE, BEING SO FIGETY OR RESTLESS THAT YOU HAVE BEEN MOVING AROUND A LOT MORE THAN USUAL: 1
SUM OF ALL RESPONSES TO PHQ QUESTIONS 1-9: 9
SUM OF ALL RESPONSES TO PHQ9 QUESTIONS 1 & 2: 0
2. FEELING DOWN, DEPRESSED OR HOPELESS: 0

## 2022-04-24 ASSESSMENT — ENCOUNTER SYMPTOMS: HEARTBURN: 1

## 2022-04-24 NOTE — PROGRESS NOTES
Elvira Moreira (:  1980) is a 35 y.o. female,Established patient, here for evaluation of the following chief complaint(s):  Diabetes (Would like a freestyle marielos meter), Gastroesophageal Reflux (States she had weight loss surgery in  and her acid reflux has been getting worse), ADHD (States she has trouble focusing on things, she procrastinates a lot.), Mass (States she has a lump on her right thumb for about a few weeks associated with pain. ), and Rash (hydrocortisone cream)         ASSESSMENT/PLAN:  1. Type 2 diabetes mellitus without complication, unspecified whether long term insulin use (HCC)  -     POCT glycosylated hemoglobin (Hb A1C)  -     POCT microalbumin  -     Continuous Blood Gluc  (FREESTYLE MARIELOS 2 READER) SHIELA; 1 each by Does not apply route once for 1 dose, Disp-1 each, R-0Normal  -     Continuous Blood Gluc Sensor (FREESTYLE MARIELOS 2 SENSOR) MISC; 1 each by Does not apply route every 14 days, Disp-2 each, R-11Normal  -     Semaglutide, 1 MG/DOSE, (OZEMPIC, 1 MG/DOSE,) 4 MG/3ML SOPN; Inject 1 mg into the skin once a week, Disp-3 mL, R-5Normal  Well controlled. Continue current medication, except change Trulicity to Ozempic per her request. RTO in 6 months. Will see if we can get her a CGM. 2. Chronic GERD  -     pantoprazole (PROTONIX) 40 MG tablet; Take 1 tablet by mouth in the morning and at bedtime, Disp-60 tablet, R-3Normal  Discontinue omeprazole as it isn't helping much at this point. Trial of high dose Protonix instead. Call or return to clinic prn if these symptoms worsen or fail to improve as anticipated. 3. Need for pneumococcal vaccination  -     Pneumococcal Conjugate PCV20, PF (Prevnar 20)  4. Need for Tdap vaccination  -     Tdap (age 6y and older) IM (MagForce Extension)  5. Need for HPV vaccination  -     HPV vaccine 9-valent IM (GARDASIL 9)  6. Attention deficit  Will have her meet with Dr. Brent Regalado for an evaluation.  If positive, will discuss possible treatment plan.  7. Pain of right thumb  -     diclofenac sodium (VOLTAREN) 1 % GEL; Apply 2 g topically 4 times daily Right thumb, Topical, 4 TIMES DAILY Starting Thu 4/21/2022, Disp-50 g, R-2, Normal      Return in 6 months (on 10/21/2022) for Dr. Chere Kocher for ADHD evaluation, Diabetes in 6 months. Subjective   SUBJECTIVE/OBJECTIVE:  Chief Complaint   Patient presents with    Diabetes     Would like a freestyle marielos meter    Gastroesophageal Reflux     States she had weight loss surgery in 2015 and her acid reflux has been getting worse    ADHD     States she has trouble focusing on things, she procrastinates a lot.  Mass     States she has a lump on her right thumb for about a few weeks associated with pain.  Rash     hydrocortisone cream       Diabetes  She presents for her follow-up diabetic visit. She has type 2 diabetes mellitus. Her disease course has been stable. Symptoms are stable. Eye exam is current. Gastroesophageal Reflux  She complains of heartburn. This is a chronic problem. The current episode started more than 1 year ago. The problem has been gradually worsening. She has tried a PPI (omeprazole 40 mg daily) for the symptoms. The treatment provided mild relief. GERD thought to be due to previous sleeve gastrectomy. Discussing getting Rou[place]-Y bypass instead. She wants to change from Trulicity to Ozempic to see if it helps her lose more weight.      Patient complains of a many year(s) history of inattention, including the following: fails to give close attention to details or makes careless mistakes in school, work, or other activities, has difficulty sustaining attention in tasks or play activities, does not seem to listen when spoken to directly, has difficulty organizing tasks and activities, does not follow through on instructions and fails to finish schoolwork, chores, or duties in the workplace, loses things that are necessary for tasks and activities, is easily distracted by extraneous stimuli, is often forgetful in daily activities and avoids engaging in tasks that require sustained attention. She presents for evaluation of suspected ADD/ADHD. Patient denies hyperactivity. Symptoms have been gradually worsening with time. Family history of ADD/ADHD: no.  Previous treatment:has included: none. Review of Systems   Gastrointestinal: Positive for heartburn. Skin: Positive for rash. Objective   Physical Exam  Vitals and nursing note reviewed. Constitutional:       Appearance: Normal appearance. HENT:      Head: Normocephalic and atraumatic. Pulmonary:      Effort: Pulmonary effort is normal.   Musculoskeletal:      Comments: Right hand with small cystic nodule at DIP joint of right thumb, tender to palpation   Neurological:      Mental Status: She is alert. Psychiatric:         Behavior: Behavior normal.            Results for POC orders placed in visit on 04/21/22   POCT glycosylated hemoglobin (Hb A1C)   Result Value Ref Range    Hemoglobin A1C 6.9 %           An electronic signature was used to authenticate this note.     --Ashlie Stockton MD

## 2022-04-25 ENCOUNTER — TELEPHONE (OUTPATIENT)
Dept: FAMILY MEDICINE CLINIC | Age: 42
End: 2022-04-25

## 2022-04-25 NOTE — TELEPHONE ENCOUNTER
PA needed for     Ozempic     Key: [id number]     Diagnosis Association: Type 2 diabetes mellitus without complication, unspecified whether long term insulin use (HCC) (E11.9)

## 2022-04-26 NOTE — TELEPHONE ENCOUNTER
Submitted PA for Ozempic (1 MG/DOSE) 4MG/3ML pen-injectors Via [facility] Key: [id number] STATUS: [id number] APPROVED  effective 03/27/2022-04/25/2025    Please notify patient.  Thank you

## 2022-05-09 ENCOUNTER — TELEMEDICINE (OUTPATIENT)
Dept: PSYCHOLOGY | Age: 42
End: 2022-05-09
Payer: COMMERCIAL

## 2022-05-09 DIAGNOSIS — F32.A DEPRESSION, UNSPECIFIED DEPRESSION TYPE: Primary | ICD-10-CM

## 2022-05-09 PROCEDURE — 90791 PSYCH DIAGNOSTIC EVALUATION: CPT | Performed by: PSYCHOLOGIST

## 2022-05-09 NOTE — PROGRESS NOTES
Behavioral Health Consultation  [facility], [address] [place] [address]  Psychologist  5/9/2022  3:40 PM EDT    Time spent with Patient:30 minutes  This is patient's first [facility] appointment. Reason for Consult:    Chief Complaint   Patient presents with    ADHD       Referring Provider: Shelton Bonds MD      Pt provided informed consent for the behavioral health program. Discussed with patient model of service to include the limits of confidentiality (i.e. abuse reporting, suicide intervention, etc.) and short-term intervention focused approach. Pt indicated understanding. Feedback given to PCP. TELEHEALTH VISIT -- Audio/Visual (During RNTNK-14 public health emergency)  }  Elvira Moreira, was evaluated through a synchronous (real-time) audio-video encounter. The patient (or guardian if applicable) is aware that this is a billable service. The visit was conducted pursuant to the emergency declaration under the [address] [place] [address] authority and the [employer] General Act. Patient identification was verified, and a caregiver was present when appropriate. The patient was located in a state where the provider was licensed to provide care. Conducted a risk-benefit analysis and determined that the patient's presenting problems are consistent with the use of telepsychology. Determined that the patient has sufficient knowledge and skills in the use of technology enabling them to adequately benefit from telepsychology. It was determined that this patient was able to be properly treated without an in-person session. Patient verified that they were currently located at the [facility] address that was provided during registration.       Verified the following information:  Patient's identification: Yes  Patient location: [address]   Patient's call back number: [phone]  Patient's emergency contact's name and number, as well as permission to contact them if needed: Extended Emergency Contact Information  Primary Emergency Contact: Kristyn Borja  Address: [address], [address] Phone: [phone]  Work Phone: [phone]  Mobile Phone: [phone]  Relation: Parent  Secondary Emergency Contact: Richard Galdamezkana CRUZ  Address: [address], [address] Phone: [phone]  Relation: Spouse    Provider location: [place], [place]     S:  Pt reports she is pretty sure she has ADHD. Takes quizzes about ADHD and fits her to a T. Has motivation problems. Loses track of things. Has pile of laundry on floor of unfolded clothes. Will get her laundry, bring to her room and empty it, but gets uninterested and does something else. Loses things she needs often. Is a  at [employer] since Feb.  Some things she loses forever and other things she finds quickly. 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 Was at [employer] for coverage. Was there for 1.5 years. Was at [employer] prior to that doing- was there for 4 years. Never fired from these jobs. At  was on performance plan as a result of ranjan audit- not necessarily because of her performance. Likes her job- works full-time. Has a hard time retaining things she reads. Went to  - has Masters in [employer]. Undergrad in health administration. Went to [employer]- 2 years at [employer]. - had 3.5 GPA. Got Associates after HS from . DID well. HS - was valedictorian at [employer]. Works at [employer] and did co-op during end of high school. Never fired from those jobs. Commented that she talked too much in school- no behavioral in school. Has 4 children- oldest is 21, 25 twins, and 12.  All living at home and oldest girlfriend there half the week.  since Feb of this year. Living with him until house is ready. Practically single parenting. Didn't pay bills on time- part of why she is . When she was staying home with kids would lose track. Has auto bill pay now. Has late payments on credit. Has to put everything on her calendar. If not on there the appointment doesn't exist. Very schedule driven. Not late to work. Starts chores but doesn't finish them. Can plan a party like what to eat and when. Took up sewing during pandemic - made 700 face masks and a quilt. No other hobbies. Kids have to do their own laundry. In conversation- not told she isn't listening, but say mind is moving a mile a minute. Does feel mind is moving quickly. Doesn't sleep well- has sleeping medications. Taking Seroquel and it's helpful. Doesn't wake feeling rested. Gets about 8 or 9 hours. Effexor: been on this for 4 years- started after grandfather passed and was anxious. Had depression in the past maybe around 2010. Started when home with kids and no help. Had baby blues after first kids. Feels anxiety is well controlled on medication except at night.                     O:  MSE:    Attitude: cooperative and friendly  Consciousness: alert  Orientation: oriented to person, place, time, general circumstance  Memory: recent and remote memory intact  Attention/Concentration: intact during session  Speech: normal rate and volume, well-articulated  Mood: \"fine\"  Affect: euthymic and congruent  Perception: within normal limits  Thought Content: within normal limits  Thought Process: logical, coherent and goal-directed  Insight: good  Judgment: intact  Ability to understand instructions: Yes  Ability to respond meaningfully: Yes  Morbid Ideation: no   Suicide Assessment: no suicidal ideation, plan, or intent  Homicidal Ideation: no    History:    Medications:   Current Outpatient Medications Medication Sig Dispense Refill    Continuous Blood Gluc Sensor (FREESTYLE VENICE 2 SENSOR) MISC 1 each by Does not apply route every 14 days 2 each 11    hydrocortisone 2.5 % cream Apply topically 2 times daily Apply  topically 2 times daily. Apply to affected area 28 g 5    pantoprazole (PROTONIX) 40 MG tablet Take 1 tablet by mouth in the morning and at bedtime 60 tablet 3    diclofenac sodium (VOLTAREN) 1 % GEL Apply 2 g topically 4 times daily Right thumb 50 g 2    Semaglutide, 1 MG/DOSE, (OZEMPIC, 1 MG/DOSE,) 4 MG/3ML SOPN Inject 1 mg into the skin once a week 3 mL 5    QUEtiapine (SEROQUEL) 50 MG tablet TAKE 1 TABLET BY MOUTH NIGHTLY AS NEEDED (INSOMNIA). 90 tablet 1    venlafaxine (EFFEXOR) 100 MG tablet TAKE 1 TABLET BY MOUTH TWICE A  tablet 0    montelukast (SINGULAIR) 10 MG tablet TAKE 1 TABLET BY MOUTH EVERY DAY EVERY NIGHT 90 tablet 1    blood glucose monitor strips Test one time a day & as needed for symptoms of irregular blood glucose. Dispense sufficient amount for indicated testing frequency plus additional to accommodate PRN testing needs. 100 strip 3    blood glucose monitor kit and supplies Dispense sufficient amount for indicated testing frequency plus additional to accommodate PRN testing needs. Dispense all needed supplies to include: monitor, strips, lancing device, lancets, control solutions, alcohol swabs.  1 kit 0    albuterol sulfate HFA (PROAIR HFA) 108 (90 Base) MCG/ACT inhaler Inhale 2 puffs into the lungs every 6 hours as needed for Wheezing 3 Inhaler 3    estradiol (ESTRACE) 0.5 MG tablet Take 1 tablet by mouth daily       levocetirizine (XYZAL) 5 MG tablet Take 1 tablet by mouth daily      triamcinolone (NASACORT) 55 MCG/ACT nasal inhaler 1 spray by Nasal route 2 times daily      omeprazole (PRILOSEC) 40 MG delayed release capsule TAKE 1 CAPSULE BY MOUTH EVERY DAY 90 capsule 0    fluticasone (FLONASE) 50 MCG/ACT nasal spray 1-2 sprays by Nasal route daily as needed      Multiple Vitamins-Minerals (MULTIVITAMIN ADULT PO) Take 1 capsule by mouth daily      Calcium Citrate-Vitamin D 200-250 MG-UNIT TABS Take 1 tablet by mouth daily      calcium carbonate (OSCAL) 500 MG TABS tablet Take 500 mg by mouth daily      levonorgestrel (MIRENA) 20 MCG/24HR IUD 1 Each by Intrauterine route once. (Patient not taking: Reported on 4/21/2022)       No current facility-administered medications for this visit. Social History:   Social History     Socioeconomic History    Marital status:      Spouse name: Not on file    Number of children: Not on file    Years of education: Not on file    Highest education level: Not on file   Occupational History    Not on file   Tobacco Use    Smoking status: Never Smoker    Smokeless tobacco: Never Used   Vaping Use    Vaping Use: Never used   Substance and Sexual Activity    Alcohol use: No     Comment: rarely    Drug use: No    Sexual activity: Yes     Partners: Male     Comment: mirena and vasectomy   Other Topics Concern    Not on file   Social History Narrative    Not on file     Social Determinants of Health     Financial Resource Strain: Low Risk     Difficulty of Paying Living Expenses: Not hard at all   Food Insecurity: No Food Insecurity    Worried About [facility] in the Last Year: Never true    [address] in the Last Year: Never true   Transportation Needs: No Transportation Needs    Lack of Transportation (Medical): No    Lack of Transportation (Non-Medical):  No   Physical Activity:     Days of Exercise per Week: Not on file    Minutes of Exercise per Session: Not on file   Stress:     Feeling of Stress : Not on file   Social Connections:     Frequency of Communication with Friends and Family: Not on file    Frequency of Social Gatherings with Friends and Family: Not on file    Attends Mormonism Services: Not on file    Active Member of Clubs or Organizations: Not on file    Attends Club or Organization Meetings: Not on file    Marital Status: Not on file   Intimate Partner Violence:     Fear of Current or Ex-Partner: Not on file    Emotionally Abused: Not on file    Physically Abused: Not on file    Sexually Abused: Not on file   Housing Stability:     Unable to Pay for Housing in the Last Year: Not on file    Number of [place] in the Last Year: Not on file    Unstable Housing in the Last Year: Not on file     TOBACCO:   reports that she has never smoked. She has never used smokeless tobacco.  ETOH:   reports no history of alcohol use. Family History:   Family History   Problem Relation Age of Onset    High Blood Pressure Mother     High Cholesterol Father     Diabetes Father     Cancer Father         melanoma, shoulder    Stroke Maternal Grandmother     Diabetes Maternal Grandmother     Vision Loss Maternal Grandmother     Diabetes Paternal Grandfather     Arthritis Paternal Grandfather        A:   Ms. Garo Parkinson was seen for evaluation of ADHD. Pt will return for continued evaluation. She was active and engaged throughout the visit. Diagnosis:    1. Depression, unspecified depression type          Plan:  Pt interventions:  Established rapport, Discussed [facility] model of care vs specialty mental health, Conducted functional assessment, [place]-setting to identify pt's primary goals for [facility] visit / overall health and treatment planning    Pt Behavioral Change Plan:   Pt set goals to 1) Return in about 2 weeks (around 5/23/2022). Please note that portions of this note may have been completed with a voice recognition program. Efforts were made to edit the dictations but occasionally words are mis-transcribed.

## 2022-05-17 DIAGNOSIS — K21.9 CHRONIC GERD: ICD-10-CM

## 2022-05-17 RX ORDER — PANTOPRAZOLE SODIUM 40 MG/1
40 TABLET, DELAYED RELEASE ORAL 2 TIMES DAILY
Qty: 60 TABLET | Refills: 3 | Status: SHIPPED | OUTPATIENT
Start: 2022-05-17 | End: 2022-09-24

## 2022-05-17 NOTE — TELEPHONE ENCOUNTER
Refill Request     CONFIRM preferrred pharmacy with the patient. If Mail Order Rx - Pend for 90 day refill.       Last Seen: Last Seen Department: 4/21/2022  Last Seen by PCP: 4/21/2022    Last Written: 04/21/2022 60 Tablet 3 refills    Next Appointment:   Future Appointments   Date Time Provider Bong Anaya   5/24/2022  4:00 PM Rac[address] [place], [place] BLANCA MEDINAY [facility]   10/25/2022  1:45 PM MD BLANCA Mario  [place] - [place]       Future appointment scheduled      Requested Prescriptions     Pending Prescriptions Disp Refills    pantoprazole (PROTONIX) 40 MG tablet [Pharmacy Med Name: PANTOPRAZOLE SOD DR 40 MG TAB] 60 tablet 3     Sig: TAKE 1 TABLET BY MOUTH IN THE MORNING AND AT BEDTIME

## 2022-05-24 ENCOUNTER — TELEMEDICINE (OUTPATIENT)
Dept: PSYCHOLOGY | Age: 42
End: 2022-05-24
Payer: COMMERCIAL

## 2022-05-24 DIAGNOSIS — F41.1 GAD (GENERALIZED ANXIETY DISORDER): ICD-10-CM

## 2022-05-24 DIAGNOSIS — F32.A DEPRESSION, UNSPECIFIED DEPRESSION TYPE: Primary | ICD-10-CM

## 2022-05-24 PROCEDURE — 90832 PSYTX W PT 30 MINUTES: CPT | Performed by: PSYCHOLOGIST

## 2022-05-24 NOTE — Clinical Note
Hi there. I've met with her twice. She does not meet criteria for ADHD. However, anxiety and depression are issues that are not well managed and she agrees. She is interested in a higher dose of effexor and I agree. What do you think? Thanks!

## 2022-05-24 NOTE — PROGRESS NOTES
Behavioral Health Consultation  [place], [place]  Psychologist  5/24/2022  4:03 PM      Time spent with Patient:30 minutes  This is patient's second [facility] appointment. Reason for Consult:    Chief Complaint   Patient presents with    Anxiety    Stress       Referring Provider: Yumiko Salazar MD        TELEHEALTH VISIT -- Audio/Visual (During [place] [address] public health emergency)  }  Elvira Moreira, was evaluated through a synchronous (real-time) audio-video encounter. The patient (or guardian if applicable) is aware that this is a billable service. The visit was conducted pursuant to the emergency declaration under the [facility], [address] [place] [address] authority and the [employer] and [employer] General Act. Patient identification was verified, and a caregiver was present when appropriate. The patient was located in a state where the provider was licensed to provide care. Conducted a risk-benefit analysis and determined that the patient's presenting problems are consistent with the use of telepsychology. Determined that the patient has sufficient knowledge and skills in the use of technology enabling them to adequately benefit from telepsychology. It was determined that this patient was able to be properly treated without an in-person session. Patient verified that they were currently located at the [facility] address that was provided during registration.       Verified the following information:  Patient's identification: Yes  Patient location: [place] [address] [facility]   Patient's call back number: [phone]  Patient's emergency contact's name and number, as well as permission to contact them if needed: Extended Emergency Contact Information  Primary Emergency Contact: Kristyn Borja  Address: [address], [address] [place] [address] [place]  Phone: [phone]  Work Phone: [phone]  Mobile Phone: [phone]  Relation: Parent  Secondary Emergency Contact: Kristyn Fraser II  Address: [address] [facility], [address] [place] [address] [place]  Phone: [phone]  Relation: Spouse    Provider location: [place], [place]     S:  Can't shut brain off at night. Takes Seroquel at night to sleep. Tosses and turns for hours. Most nights with Seroquel sleeps well. Has a hard time focusing on things sometimes - bouncing around all the things she has to do. Has a lot to do. Has 4 teenagers. Trying to get things done while also working full-time. Anxiety feels like she is letting everyone down. Feels worn out and shut down. Manages it at home and turns on TV and does nothing. At work stares at the wall until she then feels pressure to get the job done. Getting work done but could get more done. Does forget to do some things at work and gets a reminder. Nothing like transposing numbers. Forgets to respond to emails something. Does ok with interpersonal dynamics- is friendly. Will be in conversation and seems like she isn't listening but really is listening but is many steps down the road with where the conversation is going. Depression in the past: generally unmotivated to do things. Thinks maybe that is the case now. Has general apathy now and when she was depressed in the past.   Caffeine: 4-5 cups of coffee or mountain dew throughout the day  Etoh: very rarely- couple times a month maybe  No other drug use. Exercise: was doing well until Covid, used to walk. Not doing that anymore.         O:  MSE:    Attitude: cooperative and friendly  Consciousness: alert  Orientation: oriented to person, place, time, general circumstance  Memory: recent and remote memory intact  Attention/Concentration: intact during session  Speech: normal rate and volume, well-articulated  Mood: \"ok\"  Affect: euthymic and congruent  Perception: within normal limits  Thought Content: within normal limits  Thought Process: logical, coherent and goal-directed  Insight: good  Judgment: intact  Ability to understand instructions: Yes  Ability to respond meaningfully: Yes  Morbid Ideation: no   Suicide Assessment: no suicidal ideation, plan, or intent  Homicidal Ideation: no    History:    Medications:   Current Outpatient Medications   Medication Sig Dispense Refill    pantoprazole (PROTONIX) 40 MG tablet TAKE 1 TABLET BY MOUTH IN THE MORNING AND AT BEDTIME 60 tablet 3    Continuous Blood Gluc Sensor (FREESTYLE VENICE 2 SENSOR) MISC 1 each by Does not apply route every 14 days 2 each 11    hydrocortisone 2.5 % cream Apply topically 2 times daily Apply  topically 2 times daily. Apply to affected area 28 g 5    diclofenac sodium (VOLTAREN) 1 % GEL Apply 2 g topically 4 times daily Right thumb 50 g 2    Semaglutide, 1 MG/DOSE, (OZEMPIC, 1 MG/DOSE,) 4 MG/3ML SOPN Inject 1 mg into the skin once a week 3 mL 5    QUEtiapine (SEROQUEL) 50 MG tablet TAKE 1 TABLET BY MOUTH NIGHTLY AS NEEDED (INSOMNIA). 90 tablet 1    venlafaxine (EFFEXOR) 100 MG tablet TAKE 1 TABLET BY MOUTH TWICE A  tablet 0    montelukast (SINGULAIR) 10 MG tablet TAKE 1 TABLET BY MOUTH EVERY DAY EVERY NIGHT 90 tablet 1    blood glucose monitor strips Test one time a day & as needed for symptoms of irregular blood glucose. Dispense sufficient amount for indicated testing frequency plus additional to accommodate PRN testing needs. 100 strip 3    blood glucose monitor kit and supplies Dispense sufficient amount for indicated testing frequency plus additional to accommodate PRN testing needs. Dispense all needed supplies to include: monitor, strips, lancing device, lancets, control solutions, alcohol swabs.  1 kit 0    albuterol sulfate HFA (PROAIR HFA) 108 (90 Base) MCG/ACT inhaler Inhale 2 puffs into the lungs every 6 hours as needed for Wheezing 3 Inhaler 3    estradiol (ESTRACE) 0.5 MG tablet Take 1 tablet by mouth daily       levocetirizine (XYZAL) 5 MG tablet Take 1 tablet by mouth daily      triamcinolone (NASACORT) 55 MCG/ACT nasal inhaler 1 spray by Nasal route 2 times daily      omeprazole (PRILOSEC) 40 MG delayed release capsule TAKE 1 CAPSULE BY MOUTH EVERY DAY 90 capsule 0    fluticasone (FLONASE) 50 MCG/ACT nasal spray 1-2 sprays by Nasal route daily as needed      Multiple Vitamins-Minerals (MULTIVITAMIN ADULT PO) Take 1 capsule by mouth daily      Calcium Citrate-Vitamin D 200-250 MG-UNIT TABS Take 1 tablet by mouth daily      calcium carbonate (OSCAL) 500 MG TABS tablet Take 500 mg by mouth daily      levonorgestrel (MIRENA) 20 MCG/24HR IUD 1 Each by Intrauterine route once. (Patient not taking: Reported on 4/21/2022)       No current facility-administered medications for this visit. Social History:   Social History     Socioeconomic History    Marital status:      Spouse name: Not on file    Number of children: Not on file    Years of education: Not on file    Highest education level: Not on file   Occupational History    Not on file   Tobacco Use    Smoking status: Never Smoker    Smokeless tobacco: Never Used   Vaping Use    Vaping Use: Never used   Substance and Sexual Activity    Alcohol use: No     Comment: rarely    Drug use: No    Sexual activity: Yes     Partners: Male     Comment: mirena and vasectomy   Other Topics Concern    Not on file   Social History Narrative    Not on file     Social Determinants of Health     Financial Resource Strain: Low Risk     Difficulty of Paying Living Expenses: Not hard at all   Food Insecurity: No Food Insecurity    Worried About [address] in the Last Year: Never true    [facility] in the Last Year: Never true   Transportation Needs: No Transportation Needs    Lack of Transportation (Medical): No    Lack of Transportation (Non-Medical):  No   Physical Activity:  Days of Exercise per Week: Not on file    Minutes of Exercise per Session: Not on file   Stress:     Feeling of Stress : Not on file   Social Connections:     Frequency of Communication with Friends and Family: Not on file    Frequency of Social Gatherings with Friends and Family: Not on file    Attends Jainism Services: Not on file    Active Member of [address] [place] [address] [employer] or Organizations: Not on file    Attends Club or Organization Meetings: Not on file    Marital Status: Not on file   Intimate Partner Violence:     Fear of Current or Ex-Partner: Not on file    Emotionally Abused: Not on file    Physically Abused: Not on file    Sexually Abused: Not on file   Housing Stability:     Unable to Pay for Housing in the Last Year: Not on file    Number of [place] in the Last Year: Not on file    Unstable Housing in the Last Year: Not on file     TOBACCO:   reports that she has never smoked. She has never used smokeless tobacco.  ETOH:   reports no history of alcohol use. Family History:   Family History   Problem Relation Age of Onset    High Blood Pressure Mother     High Cholesterol Father     Diabetes Father     Cancer Father         melanoma, shoulder    Stroke Maternal Grandmother     Diabetes Maternal Grandmother     Vision Loss Maternal Grandmother     Diabetes Paternal Grandfather     Arthritis Paternal Grandfather        A:   Ms. Sixto Mathew was seen for evaluation of ADHD. Pt does not meet criteria for ADHD. Her difficulties are mood related. Pt struggles with anxiety and depressive symptoms. She was provided psychoeducation about impact of mood on cognitive functioning and pt endorsed worsening depression that she did not recognize until today. She was active and engaged throughout the visit. She would benefit from behavioral interventions for mood management and improved attention. She is interested in a medication dose increase. Diagnosis:    1. Depression, unspecified depression type    2. WALI (generalized anxiety disorder)          Plan:  Pt interventions:  Conducted functional assessment, [place]-setting to identify pt's primary goals for [facility] visit / overall health, Supportive techniques, Provided psychoeducation re: impact of mood on cognitive functioning, Discussed potential treatments for  depression and anxiety, Provided education on the use of medication to treat  depression and anxiety, Discussed various factors related to the development and maintenance of  depression and anxiety (including biological, cognitive, behavioral, and environmental factors), ACT interventions and treatment planning    Pt Behavioral Change Plan:   Pt set goals to 1) f/u with PCP re: increase in effexor 2) Return in about 2 weeks (around 6/7/2022). Please note that portions of this note may have been completed with a voice recognition program. Efforts were made to edit the dictations but occasionally words are mis-transcribed.

## 2022-05-25 ENCOUNTER — TELEPHONE (OUTPATIENT)
Dept: FAMILY MEDICINE CLINIC | Age: 42
End: 2022-05-25

## 2022-05-25 DIAGNOSIS — F41.9 ANXIETY: ICD-10-CM

## 2022-05-25 PROBLEM — F41.1 GAD (GENERALIZED ANXIETY DISORDER): Status: ACTIVE | Noted: 2022-05-25

## 2022-05-25 RX ORDER — VENLAFAXINE 75 MG/1
TABLET ORAL
Qty: 360 TABLET | Refills: 1 | Status: SHIPPED | OUTPATIENT
Start: 2022-05-25

## 2022-05-31 DIAGNOSIS — J30.9 ALLERGIC RHINITIS, UNSPECIFIED SEASONALITY, UNSPECIFIED TRIGGER: ICD-10-CM

## 2022-05-31 DIAGNOSIS — J45.30 MILD PERSISTENT ASTHMA WITHOUT COMPLICATION: ICD-10-CM

## 2022-05-31 NOTE — TELEPHONE ENCOUNTER
Refill Request     CONFIRM preferrred pharmacy with the patient. If Mail Order Rx - Pend for 90 day refill.       Last Seen: Last Seen Department: 4/21/2022  Last Seen by PCP: 4/21/2022    Last Written: 2/7/2022 90 with 1     Next Appointment:   Future Appointments   Date Time Provider Bong Anaya   6/10/2022  1:00 PM [place], [place] BLANCA DAVID   10/25/2022  1:45 PM MD BLANCA Mario  [place] - [facility]       Future appointment scheduled      Requested Prescriptions     Pending Prescriptions Disp Refills    montelukast (SINGULAIR) 10 MG tablet 90 tablet 1

## 2022-06-01 RX ORDER — MONTELUKAST SODIUM 10 MG/1
TABLET ORAL
Qty: 90 TABLET | Refills: 1 | Status: SHIPPED | OUTPATIENT
Start: 2022-06-01 | End: 2022-10-19

## 2022-06-10 ENCOUNTER — TELEMEDICINE (OUTPATIENT)
Dept: PSYCHOLOGY | Age: 42
End: 2022-06-10
Payer: COMMERCIAL

## 2022-06-10 DIAGNOSIS — F41.1 GAD (GENERALIZED ANXIETY DISORDER): ICD-10-CM

## 2022-06-10 DIAGNOSIS — F32.A DEPRESSION, UNSPECIFIED DEPRESSION TYPE: Primary | ICD-10-CM

## 2022-06-10 PROCEDURE — 90832 PSYTX W PT 30 MINUTES: CPT | Performed by: PSYCHOLOGIST

## 2022-06-10 NOTE — PROGRESS NOTES
Behavioral Health Consultation  [facility], [address] [place] [address]  Psychologist  6/10/2022  1:04 PM      Time spent with Patient:30 minutes  This is patient's third [facility] appointment. Reason for Consult:    Chief Complaint   Patient presents with    Stress    Anxiety       Referring Provider: Renee Rubio MD        TELEHEALTH VISIT -- Audio/Visual (During LNVTD-98 public health emergency)  }  Elvira Moreira, was evaluated through a synchronous (real-time) audio-video encounter. The patient (or guardian if applicable) is aware that this is a billable service. The visit was conducted pursuant to the emergency declaration under the [facility], [address] [place] [address] authority and the [employer] Act. Patient identification was verified, and a caregiver was present when appropriate. The patient was located in a state where the provider was licensed to provide care. Conducted a risk-benefit analysis and determined that the patient's presenting problems are consistent with the use of telepsychology. Determined that the patient has sufficient knowledge and skills in the use of technology enabling them to adequately benefit from telepsychology. It was determined that this patient was able to be properly treated without an in-person session. Patient verified that they were currently located at the [facility] address that was provided during registration.       Verified the following information:  Patient's identification: Yes  Patient location: [place] [address] [facility]   Patient's call back number: [phone]  Patient's emergency contact's name and number, as well as permission to contact them if needed: Extended Emergency Contact Information  Primary Emergency Contact: Kristyn Borja  Address: [address] [facility] [address], [address] [place] [address] [place]  Phone: [phone]  Work Phone: [phone]  Mobile Phone: [phone]  Relation: Parent  Secondary Emergency Contact: Rolando Moreira II  Address: [address] [place] [address], [address] [place] [address] [address] Phone: [phone]  Relation: Spouse    Provider location: [place], [place]     S:  During the last visit pt set goals to 1) f/u with PCP re: increase in effexor   Pt reports she is \"good. \"   Did get medication increased but only on it for the past 2 days. Feels less exhausted the past 2 days. General lack of motivation most days. Apathy about most things. Kids graduation party over the weekend. Didn't care to make a big thing- mom took over planning. Exhausted by it. Gets criticized by  and mother about what she is doing or not doing. Doing one chore and is criticized to be doing a different chore. Feels defeated by it- doesn't say anything but also doesn't react to it by changing her activity.       O:  MSE:    Attitude: cooperative and friendly  Consciousness: alert  Orientation: oriented to person, place, time, general circumstance  Memory: recent and remote memory intact  Attention/Concentration: intact during session  Speech: normal rate and volume, well-articulated  Mood: \"good\"  Affect: euthymic and congruent  Perception: within normal limits  Thought Content: within normal limits  Thought Process: logical, coherent and goal-directed  Insight: good  Judgment: intact  Ability to understand instructions: Yes  Ability to respond meaningfully: Yes  Morbid Ideation: no   Suicide Assessment: no suicidal ideation, plan, or intent  Homicidal Ideation: no    History:    Medications:   Current Outpatient Medications   Medication Sig Dispense Refill    montelukast (SINGULAIR) 10 MG tablet TAKE 1 TABLET BY MOUTH EVERY DAY EVERY NIGHT 90 tablet 1    venlafaxine (EFFEXOR) 75 mg tablet TAKE 2 TABLETS BY MOUTH TWICE A  tablet 1    pantoprazole (PROTONIX) 40 MG tablet TAKE 1 TABLET BY MOUTH IN THE MORNING AND AT BEDTIME 60 tablet 3    Continuous Blood Gluc Sensor (FREESTYLE VENICE 2 SENSOR) MISC 1 each by Does not apply route every 14 days 2 each 11    hydrocortisone 2.5 % cream Apply topically 2 times daily Apply  topically 2 times daily. Apply to affected area 28 g 5    diclofenac sodium (VOLTAREN) 1 % GEL Apply 2 g topically 4 times daily Right thumb 50 g 2    Semaglutide, 1 MG/DOSE, (OZEMPIC, 1 MG/DOSE,) 4 MG/3ML SOPN Inject 1 mg into the skin once a week 3 mL 5    QUEtiapine (SEROQUEL) 50 MG tablet TAKE 1 TABLET BY MOUTH NIGHTLY AS NEEDED (INSOMNIA). 90 tablet 1    blood glucose monitor strips Test one time a day & as needed for symptoms of irregular blood glucose. Dispense sufficient amount for indicated testing frequency plus additional to accommodate PRN testing needs. 100 strip 3    blood glucose monitor kit and supplies Dispense sufficient amount for indicated testing frequency plus additional to accommodate PRN testing needs. Dispense all needed supplies to include: monitor, strips, lancing device, lancets, control solutions, alcohol swabs.  1 kit 0    albuterol sulfate HFA (PROAIR HFA) 108 (90 Base) MCG/ACT inhaler Inhale 2 puffs into the lungs every 6 hours as needed for Wheezing 3 Inhaler 3    estradiol (ESTRACE) 0.5 MG tablet Take 1 tablet by mouth daily       levocetirizine (XYZAL) 5 MG tablet Take 1 tablet by mouth daily      triamcinolone (NASACORT) 55 MCG/ACT nasal inhaler 1 spray by Nasal route 2 times daily      omeprazole (PRILOSEC) 40 MG delayed release capsule TAKE 1 CAPSULE BY MOUTH EVERY DAY 90 capsule 0    fluticasone (FLONASE) 50 MCG/ACT nasal spray 1-2 sprays by Nasal route daily as needed      Multiple Vitamins-Minerals (MULTIVITAMIN ADULT PO) Take 1 capsule by mouth daily      Calcium Citrate-Vitamin D 200-250 MG-UNIT TABS Take 1 tablet by mouth daily      calcium carbonate (OSCAL) 500 MG TABS tablet Take 500 mg by mouth daily  levonorgestrel (MIRENA) 20 MCG/24HR IUD 1 Each by Intrauterine route once. (Patient not taking: Reported on 4/21/2022)       No current facility-administered medications for this visit. Social History:   Social History     Socioeconomic History    Marital status:      Spouse name: Not on file    Number of children: Not on file    Years of education: Not on file    Highest education level: Not on file   Occupational History    Not on file   Tobacco Use    Smoking status: Never Smoker    Smokeless tobacco: Never Used   Vaping Use    Vaping Use: Never used   Substance and Sexual Activity    Alcohol use: No     Comment: rarely    Drug use: No    Sexual activity: Yes     Partners: Male     Comment: mirena and vasectomy   Other Topics Concern    Not on file   Social History Narrative    Not on file     Social Determinants of Health     Financial Resource Strain: Low Risk     Difficulty of Paying Living Expenses: Not hard at all   Food Insecurity: No Food Insecurity    Worried About [address] in the Last Year: Never true    [address] [employer] in the Last Year: Never true   Transportation Needs: No Transportation Needs    Lack of Transportation (Medical): No    Lack of Transportation (Non-Medical):  No   Physical Activity:     Days of Exercise per Week: Not on file    Minutes of Exercise per Session: Not on file   Stress:     Feeling of Stress : Not on file   Social Connections:     Frequency of Communication with Friends and Family: Not on file    Frequency of Social Gatherings with Friends and Family: Not on file    Attends Moravian Services: Not on file    Active Member of Clubs or Organizations: Not on file    Attends Club or Organization Meetings: Not on file    Marital Status: Not on file   Intimate Partner Violence:     Fear of Current or Ex-Partner: Not on file    Emotionally Abused: Not on file    Physically Abused: Not on file    Sexually Abused: Not on file Housing Stability:     Unable to Pay for Housing in the Last Year: Not on file    Number of Places Lived in the Last Year: Not on file    Unstable Housing in the Last Year: Not on file     TOBACCO:   reports that she has never smoked. She has never used smokeless tobacco.  ETOH:   reports no history of alcohol use. Family History:   Family History   Problem Relation Age of Onset    High Blood Pressure Mother     High Cholesterol Father     Diabetes Father     Cancer Father         melanoma, shoulder    Stroke Maternal Grandmother     Diabetes Maternal Grandmother     Vision Loss Maternal Grandmother     Diabetes Paternal Grandfather     Arthritis Paternal Grandfather        A:   Ms. Noni Story continues to struggle with anxiety and depression exacerbated by various stressors. She has limited coping strategies to manage these difficulties. She was active and engaged and responded positively to behavioral interventions. She would benefit from ongoing behavioral interventions for more effective mood management. Diagnosis:    1. Depression, unspecified depression type    2. WALI (generalized anxiety disorder)          Plan:  Pt interventions:  [place]-setting to identify pt's primary goals for [facility] visit / overall health, Supportive techniques, Provided psychoeducation re: Stress cycle, completing the stress cycle, reinforced pt's skill use, ACT interventions, CBT interventions and treatment planning    Pt Behavioral Change Plan:   Pt set goals to 1) continue to monitor mood with increased medication dose  2) consider expressing but would be helpful or unhelpful in regards to feedback from  and mother 3) explore ways to help complete her stress cycle like engage in more activity or something more meaningful each day)  4 Return in about 1 month (around 7/10/2022).         Please note that portions of this note may have been completed with a voice recognition program. Efforts were made to edit the dictations but occasionally words are mis-transcribed.

## 2022-06-29 NOTE — TELEPHONE ENCOUNTER
Submitted PA for Venlafaxine HCl 75MG tablets, Key: [id number]. Status:Approved; Review Type:Qty;Coverage Start Date:04/26/2022; Coverage End Date:05/26/2025. Please notify patient. Thank you.

## 2022-07-07 ENCOUNTER — TELEMEDICINE (OUTPATIENT)
Dept: PSYCHOLOGY | Age: 42
End: 2022-07-07
Payer: COMMERCIAL

## 2022-07-07 DIAGNOSIS — F41.1 GAD (GENERALIZED ANXIETY DISORDER): ICD-10-CM

## 2022-07-07 DIAGNOSIS — F32.A DEPRESSION, UNSPECIFIED DEPRESSION TYPE: Primary | ICD-10-CM

## 2022-07-07 PROCEDURE — 90832 PSYTX W PT 30 MINUTES: CPT | Performed by: PSYCHOLOGIST

## 2022-07-07 NOTE — PROGRESS NOTES
Behavioral Health Consultation  [place] [address] [place] [address]  Psychologist  7/7/2022  11:52 AM      Time spent with Patient:16 minutes  This is patient's fourth [facility] appointment. Reason for Consult:    Chief Complaint   Patient presents with    Depression    Anxiety       Referring Provider: Chadwick Shen MD        TELEHEALTH VISIT -- Audio/Visual (During [facility] public health emergency)  }  Elvira Moreira, was evaluated through a synchronous (real-time) audio-video encounter. The patient (or guardian if applicable) is aware that this is a billable service. The visit was conducted pursuant to the emergency declaration under the [address] [place] [address], [address] [place] [address] authority and the [employer] Act. Patient identification was verified, and a caregiver was present when appropriate. The patient was located in a state where the provider was licensed to provide care. Conducted a risk-benefit analysis and determined that the patient's presenting problems are consistent with the use of telepsychology. Determined that the patient has sufficient knowledge and skills in the use of technology enabling them to adequately benefit from telepsychology. It was determined that this patient was able to be properly treated without an in-person session. Patient verified that they were currently located at the [facility] address that was provided during registration.       Verified the following information:  Patient's identification: Yes  Patient location: [place] [address] [facility]   Patient's call back number: [phone]  Patient's emergency contact's name and number, as well as permission to contact them if needed: Extended Emergency Contact Information  Primary Emergency Contact: Kristyn Borja  Address: [address] [place] [address] [address], [address] [place] [address] [place]  Phone: [phone]  Work Phone: [phone]  Mobile Phone: [phone]  Relation: Parent  Secondary Emergency Contact: Rolando Moreira II  Address: [address] [facility], [address] [place] [address] [address] Phone: [phone]  Relation: Spouse    Provider location: [place]:  During the last visit Pt set goals to 1) continue to monitor mood with increased medication dose  2) consider expressing but would be helpful or unhelpful in regards to feedback from  and mother 3) explore ways to help complete her stress cycle like engage in more activity or something more meaningful each day)    Pt reports has been doing \"ok. \"  Feels the medication dose increase has been helpful. Feels better - less intensely depressed and has experienced more positive emotion. Does still feel tired but thinks that is a perpetual state. Night sweats are gone. Has told her ex  more what is helpful or not. GOing on a trip out of the country for a couple weeks. Is also packing and getting ready for a move. Has been thinking about dating now that she has been  6 months.    O:  MSE:    Attitude: cooperative and friendly  Consciousness: alert  Orientation: oriented to person, place, time, general circumstance  Memory: recent and remote memory intact  Attention/Concentration: intact during session  Speech: normal rate and volume, well-articulated  Mood: \"Okay\"  Affect: euthymic and congruent  Perception: within normal limits  Thought Content: within normal limits  Thought Process: logical, coherent and goal-directed  Insight: good  Judgment: intact  Ability to understand instructions: Yes  Ability to respond meaningfully: Yes  Morbid Ideation: no   Suicide Assessment: no suicidal ideation, plan, or intent  Homicidal Ideation: no    History:    Medications:   Current Outpatient Medications   Medication Sig Dispense Refill    montelukast (SINGULAIR) 10 MG tablet TAKE 1 TABLET BY MOUTH EVERY DAY EVERY NIGHT 90 tablet 1    venlafaxine (EFFEXOR) 75 mg tablet TAKE 2 TABLETS BY MOUTH TWICE A  tablet 1    pantoprazole (PROTONIX) 40 MG tablet TAKE 1 TABLET BY MOUTH IN THE MORNING AND AT BEDTIME 60 tablet 3    Continuous Blood Gluc Sensor (FREESTYLE VENICE 2 SENSOR) MISC 1 each by Does not apply route every 14 days 2 each 11    hydrocortisone 2.5 % cream Apply topically 2 times daily Apply  topically 2 times daily. Apply to affected area 28 g 5    diclofenac sodium (VOLTAREN) 1 % GEL Apply 2 g topically 4 times daily Right thumb 50 g 2    Semaglutide, 1 MG/DOSE, (OZEMPIC, 1 MG/DOSE,) 4 MG/3ML SOPN Inject 1 mg into the skin once a week 3 mL 5    QUEtiapine (SEROQUEL) 50 MG tablet TAKE 1 TABLET BY MOUTH NIGHTLY AS NEEDED (INSOMNIA). 90 tablet 1    blood glucose monitor strips Test one time a day & as needed for symptoms of irregular blood glucose. Dispense sufficient amount for indicated testing frequency plus additional to accommodate PRN testing needs. 100 strip 3    blood glucose monitor kit and supplies Dispense sufficient amount for indicated testing frequency plus additional to accommodate PRN testing needs. Dispense all needed supplies to include: monitor, strips, lancing device, lancets, control solutions, alcohol swabs.  1 kit 0    albuterol sulfate HFA (PROAIR HFA) 108 (90 Base) MCG/ACT inhaler Inhale 2 puffs into the lungs every 6 hours as needed for Wheezing 3 Inhaler 3    estradiol (ESTRACE) 0.5 MG tablet Take 1 tablet by mouth daily       levocetirizine (XYZAL) 5 MG tablet Take 1 tablet by mouth daily      triamcinolone (NASACORT) 55 MCG/ACT nasal inhaler 1 spray by Nasal route 2 times daily      omeprazole (PRILOSEC) 40 MG delayed release capsule TAKE 1 CAPSULE BY MOUTH EVERY DAY 90 capsule 0    fluticasone (FLONASE) 50 MCG/ACT nasal spray 1-2 sprays by Nasal route daily as needed      Multiple Vitamins-Minerals (MULTIVITAMIN ADULT PO) Take 1 capsule by mouth daily      Calcium Citrate-Vitamin D 200-250 MG-UNIT TABS Take 1 tablet by mouth daily      calcium carbonate (OSCAL) 500 MG TABS tablet Take 500 mg by mouth daily      levonorgestrel (MIRENA) 20 MCG/24HR IUD 1 Each by Intrauterine route once. (Patient not taking: Reported on 4/21/2022)       No current facility-administered medications for this visit. Social History:   Social History     Socioeconomic History    Marital status:      Spouse name: Not on file    Number of children: Not on file    Years of education: Not on file    Highest education level: Not on file   Occupational History    Not on file   Tobacco Use    Smoking status: Never Smoker    Smokeless tobacco: Never Used   Vaping Use    Vaping Use: Never used   Substance and Sexual Activity    Alcohol use: No     Comment: rarely    Drug use: No    Sexual activity: Yes     Partners: Male     Comment: mirena and vasectomy   Other Topics Concern    Not on file   Social History Narrative    Not on file     Social Determinants of Health     Financial Resource Strain: Low Risk     Difficulty of Paying Living Expenses: Not hard at all   Food Insecurity: No Food Insecurity    Worried About [address] in the Last Year: Never true    [facility] in the Last Year: Never true   Transportation Needs: No Transportation Needs    Lack of Transportation (Medical): No    Lack of Transportation (Non-Medical):  No   Physical Activity:     Days of Exercise per Week: Not on file    Minutes of Exercise per Session: Not on file   Stress:     Feeling of Stress : Not on file   Social Connections:     Frequency of Communication with Friends and Family: Not on file    Frequency of Social Gatherings with Friends and Family: Not on file    Attends Orthodox Services: Not on file    Active Member of Clubs or Organizations: Not on file    Attends Club or Organization Meetings: Not on file    Marital Status: Not on file   Intimate Partner Violence:     Fear of Current or Ex-Partner: Not on file    Emotionally Abused: Not on file    Physically Abused: Not on file    Sexually Abused: Not on file   Housing Stability:     Unable to Pay for Housing in the Last Year: Not on file    Number of [place] in the Last Year: Not on file    Unstable Housing in the Last Year: Not on file     TOBACCO:   reports that she has never smoked. She has never used smokeless tobacco.  ETOH:   reports no history of alcohol use. Family History:   Family History   Problem Relation Age of Onset    High Blood Pressure Mother     High Cholesterol Father     Diabetes Father     Cancer Father         melanoma, shoulder    Stroke Maternal Grandmother     Diabetes Maternal Grandmother     Vision Loss Maternal Grandmother     Diabetes Paternal Grandfather     Arthritis Paternal Grandfather        A:   Ms. Scarlett Lobo has experienced improvement in overall mood she feels medications have been helpful. He has experienced more positive emotions. She continues to be active and engaged and responds positively to behavioral interventions. Diagnosis:    No diagnosis found. Plan:  Pt interventions:  [place]-setting to identify pt's primary goals for [facility] visit / overall health, Supportive techniques, Provided psychoeducation re: Stress cycle, completing the stress cycle, reinforced pt's skill use, ACT interventions, CBT interventions and treatment planning    Pt Behavioral Change Plan:   Pt set goals to 1) continue to explore ways to complete stress cycle like 2)  Return in about 1 month (around 8/7/2022). Please note that portions of this note may have been completed with a voice recognition program. Efforts were made to edit the dictations but occasionally words are mis-transcribed.

## 2022-08-04 ENCOUNTER — TELEMEDICINE (OUTPATIENT)
Dept: PSYCHOLOGY | Age: 42
End: 2022-08-04

## 2022-08-04 DIAGNOSIS — F41.1 GAD (GENERALIZED ANXIETY DISORDER): ICD-10-CM

## 2022-08-04 DIAGNOSIS — F32.A DEPRESSION, UNSPECIFIED DEPRESSION TYPE: Primary | ICD-10-CM

## 2022-08-04 PROCEDURE — 90832 PSYTX W PT 30 MINUTES: CPT | Performed by: PSYCHOLOGIST

## 2022-08-04 NOTE — PROGRESS NOTES
Behavioral Health Consultation  [facility], [address] [place] [address]  Psychologist  8/4/2022  12:59 PM      Time spent with Patient:16 minutes  This is patient's fifth [facility] appointment. Reason for Consult:    Chief Complaint   Patient presents with    Stress         Referring Provider: Rolly Euceda MD        TELEHEALTH VISIT -- Audio/Visual (During [facility]- public [facility] emergency)  }  Elvira Moreira, was evaluated through a synchronous (real-time) audio-video encounter. The patient (or guardian if applicable) is aware that this is a billable service. The visit was conducted pursuant to the emergency declaration under the [facility], [facility] authority and the [employer] Act. Patient identification was verified, and a caregiver was present when appropriate. The patient was located in a state where the provider was licensed to provide care. Conducted a risk-benefit analysis and determined that the patient's presenting problems are consistent with the use of telepsychology. Determined that the patient has sufficient knowledge and skills in the use of technology enabling them to adequately benefit from telepsychology. It was determined that this patient was able to be properly treated without an in-person session. Patient verified that they were currently located at the [facility] address that was provided during registration.       Verified the following information:  Patient's identification: Yes  Patient location[facility] [place] [id number] [facility]   Patient's call back number: [phone]  Patient's emergency contact's name and number, as well as permission to contact them if needed: Extended Emergency Contact Information  Primary Emergency Contact: Kristyn Borja  Address: [address] [place], [place] [address] [place] [address] Dr Hughes, [address] [place] [address] Phone: [phone]  Work Phone: [phone]  Mobile Phone: [phone]  Relation: Parent  Secondary Emergency Contact: Rolando Moreira PARMJIT CRUZ  Address: [address] [place] [address], [address] [place] [address] of [address] Phone: [phone]  Relation: Spouse    Provider location: Arely Wagner:  During the last visit set goals to 1) continue to explore ways to complete stress cycle like    Pt reports she is \"good. \" Had a great trip. Came back to boss wanting to fire her. Is on probation until August 7- has gotten performance feedback that is great. Feedback from him was that she wasn't doing her job and not allowing the men to do their job. Also giving very vague complaints. Has made multiple sexist comments. Has gotten HR and EEOC involved. Not living with ex- anymore- is peaceful. Mood has been good outside of this situation. Still feels medication has been helpful. Hasn't gotten overly emotional in dealing with this.     O:  MSE:    Attitude: cooperative and friendly  Consciousness: alert  Orientation: oriented to person, place, time, general circumstance  Memory: recent and remote memory intact  Attention/Concentration: intact during session  Speech: normal rate and volume, well-articulated  Mood: \"good\"  Affect: euthymic and congruent  Perception: within normal limits  Thought Content: within normal limits  Thought Process: logical, coherent and goal-directed  Insight: good  Judgment: intact  Ability to understand instructions: Yes  Ability to respond meaningfully: Yes  Morbid Ideation: no   Suicide Assessment: no suicidal ideation, plan, or intent  Homicidal Ideation: no    History:    Medications:   Current Outpatient Medications   Medication Sig Dispense Refill    montelukast (SINGULAIR) 10 MG tablet TAKE 1 TABLET BY MOUTH EVERY DAY EVERY NIGHT 90 tablet 1    venlafaxine (EFFEXOR) 75 mg tablet TAKE 2 TABLETS BY MOUTH TWICE A  tablet 1    pantoprazole (PROTONIX) 40 MG tablet TAKE 1 TABLET BY MOUTH IN THE MORNING AND AT BEDTIME 60 tablet 3    Continuous Blood Gluc Sensor (FREESTYLE VENICE 2 SENSOR) MISC 1 each by Does not apply route every 14 days 2 each 11    hydrocortisone 2.5 % cream Apply topically 2 times daily Apply  topically 2 times daily. Apply to affected area 28 g 5    diclofenac sodium (VOLTAREN) 1 % GEL Apply 2 g topically 4 times daily Right thumb 50 g 2    Semaglutide, 1 MG/DOSE, (OZEMPIC, 1 MG/DOSE,) 4 MG/3ML SOPN Inject 1 mg into the skin once a week 3 mL 5    QUEtiapine (SEROQUEL) 50 MG tablet TAKE 1 TABLET BY MOUTH NIGHTLY AS NEEDED (INSOMNIA). 90 tablet 1    blood glucose monitor strips Test one time a day & as needed for symptoms of irregular blood glucose. Dispense sufficient amount for indicated testing frequency plus additional to accommodate PRN testing needs. 100 strip 3    blood glucose monitor kit and supplies Dispense sufficient amount for indicated testing frequency plus additional to accommodate PRN testing needs. Dispense all needed supplies to include: monitor, strips, lancing device, lancets, control solutions, alcohol swabs.  1 kit 0    albuterol sulfate HFA (PROAIR HFA) 108 (90 Base) MCG/ACT inhaler Inhale 2 puffs into the lungs every 6 hours as needed for Wheezing 3 Inhaler 3    estradiol (ESTRACE) 0.5 MG tablet Take 1 tablet by mouth daily       levocetirizine (XYZAL) 5 MG tablet Take 1 tablet by mouth daily      triamcinolone (NASACORT) 55 MCG/ACT nasal inhaler 1 spray by Nasal route 2 times daily      omeprazole (PRILOSEC) 40 MG delayed release capsule TAKE 1 CAPSULE BY MOUTH EVERY DAY 90 capsule 0    fluticasone (FLONASE) 50 MCG/ACT nasal spray 1-2 sprays by Nasal route daily as needed      Multiple Vitamins-Minerals (MULTIVITAMIN ADULT PO) Take 1 capsule by mouth daily      Calcium Citrate-Vitamin D 200-250 MG-UNIT TABS Take 1 tablet by mouth daily      calcium carbonate (OSCAL) 500 MG TABS tablet Take 500 mg by mouth daily      levonorgestrel (MIRENA) 20 MCG/24HR IUD 1 Each by Intrauterine route once. (Patient not taking: Reported on 4/21/2022)       No current facility-administered medications for this visit. Social History:   Social History     Socioeconomic History    Marital status:      Spouse name: Not on file    Number of children: Not on file    Years of education: Not on file    Highest education level: Not on file   Occupational History    Not on file   Tobacco Use    Smoking status: Never    Smokeless tobacco: Never   Vaping Use    Vaping Use: Never used   Substance and Sexual Activity    Alcohol use: No     Comment: rarely    Drug use: No    Sexual activity: Yes     Partners: Male     Comment: mirena and vasectomy   Other Topics Concern    Not on file   Social History Narrative    Not on file     Social Determinants of Health     Financial Resource Strain: Not on file   Food Insecurity: Not on file   Transportation Needs: Not on file   Physical Activity: Not on file   Stress: Not on file   Social Connections: Not on file   Intimate Partner Violence: Not on file   Housing Stability: Not on file     TOBACCO:   reports that she has never smoked. She has never used smokeless tobacco.  ETOH:   reports no history of alcohol use. Family History:   Family History   Problem Relation Age of Onset    High Blood Pressure Mother     High Cholesterol Father     Diabetes Father     Cancer Father         melanoma, shoulder    Stroke Maternal Grandmother     Diabetes Maternal Grandmother     Vision Loss Maternal Grandmother     Diabetes Paternal Grandfather     Arthritis Paternal Grandfather        A:   Ms. Mary Hunter continues to note improvement with overall depression and anxiety. Work stress has been a new stressor although pt has been effectively managing this stressor. She continues to be active and engaged and responds positively to behavioral interventions. She denies need for f/u at this time. Diagnosis:    1.  Depression, unspecified depression type    2. WALI (generalized anxiety disorder)          Plan:  Pt interventions:  [place]ann[place]-setting to identify pt's primary goals for [facility] visit / overall health, Supportive techniques, reinforced pt's skill use, ACT interventions, and treatment planning    Pt Behavioral Change Plan:   Pt set goals to 1) Return for follow up, as needed. Please note that portions of this note may have been completed with a voice recognition program. Efforts were made to edit the dictations but occasionally words are mis-transcribed.

## 2022-08-13 DIAGNOSIS — J45.30 MILD PERSISTENT ASTHMA WITHOUT COMPLICATION: ICD-10-CM

## 2022-08-15 RX ORDER — ALBUTEROL SULFATE 90 UG/1
AEROSOL, METERED RESPIRATORY (INHALATION)
Qty: 1 EACH | Refills: 3 | Status: SHIPPED | OUTPATIENT
Start: 2022-08-15

## 2022-08-15 NOTE — TELEPHONE ENCOUNTER
Refill Request     CONFIRM preferrred pharmacy with the patient. If Mail Order Rx - Pend for 90 day refill.       Last Seen: Last Seen Department: 4/21/2022  Last Seen by PCP: 4/21/2022    Last Written: 08/01/2021 3 inhaler 3 refills     Next Appointment:   Future Appointments   Date Time Provider Bong Anaya   10/25/2022  1:45 PM [facility]  [place] [facility]       Future appointment scheduled      Requested Prescriptions     Pending Prescriptions Disp Refills    albuterol sulfate HFA (PROVENTIL;VENTOLIN;PROAIR) 108 (90 Base) MCG/ACT inhaler [Pharmacy Med Name: ALBUTEROL HFA (PROVENTIL) INH] 20.1 each 3     Sig: TAKE 2 PUFFS BY MOUTH EVERY 6 HOURS AS NEEDED FOR WHEEZE

## 2022-09-24 DIAGNOSIS — K21.9 CHRONIC GERD: ICD-10-CM

## 2022-09-24 RX ORDER — PANTOPRAZOLE SODIUM 40 MG/1
TABLET, DELAYED RELEASE ORAL
Qty: 180 TABLET | Refills: 1 | Status: SHIPPED | OUTPATIENT
Start: 2022-09-24

## 2022-09-24 NOTE — TELEPHONE ENCOUNTER
.Refill Request     CONFIRM preferrred pharmacy with the patient. If Mail Order Rx - Pend for 90 day refill. Last Seen: Last Seen Department: 4/21/2022  Last Seen by PCP: 4/21/2022    Last Written: 5/17/22 60 with 3     If no future appointment scheduled, route STAFF MESSAGE with patient name to the [facility] for scheduling. Next Appointment:   Future Appointments   Date Time Provider Bong Anaya   10/25/2022  1:45 PM MD BLANCA [facility]       Message sent to [address] [place] [address] to schedule appt with patient?   N/A      Requested Prescriptions     Pending Prescriptions Disp Refills    pantoprazole (PROTONIX) 40 MG tablet [Pharmacy Med Name: PANTOPRAZOLE SOD DR 40 MG TAB] 180 tablet 1     Sig: TAKE 1 TABLET BY MOUTH IN THE MORNING AND IN THE EVENING

## 2022-10-19 DIAGNOSIS — J45.30 MILD PERSISTENT ASTHMA WITHOUT COMPLICATION: ICD-10-CM

## 2022-10-19 DIAGNOSIS — J30.9 ALLERGIC RHINITIS, UNSPECIFIED SEASONALITY, UNSPECIFIED TRIGGER: ICD-10-CM

## 2022-10-19 DIAGNOSIS — F51.01 PRIMARY INSOMNIA: ICD-10-CM

## 2022-10-19 RX ORDER — MONTELUKAST SODIUM 10 MG/1
TABLET ORAL
Qty: 90 TABLET | Refills: 1 | Status: SHIPPED | OUTPATIENT
Start: 2022-10-19

## 2022-10-19 RX ORDER — QUETIAPINE FUMARATE 50 MG/1
50 TABLET, FILM COATED ORAL NIGHTLY PRN
Qty: 90 TABLET | Refills: 1 | Status: SHIPPED | OUTPATIENT
Start: 2022-10-19

## 2022-10-19 NOTE — TELEPHONE ENCOUNTER
Refill Request     CONFIRM preferrred pharmacy with the patient. If Mail Order Rx - Pend for 90 day refill. Last Seen: Last Seen Department: 4/21/2022  Last Seen by PCP: Visit date not found    Last Written: 06/01/2022 90 tablet 1 refills     If no future appointment scheduled, route STAFF MESSAGE with patient name to the [facility] for scheduling. Next Appointment:   Future Appointments   Date Time Provider Bong Anaya   10/25/2022  1:45 PM MD BLANCA Peña  [place] - [facility]       Message sent to [employer] to schedule appt with patient?   NO      Requested Prescriptions     Pending Prescriptions Disp Refills    montelukast (SINGULAIR) 10 MG tablet [Pharmacy Med Name: MONTELUKAST SOD 10 MG TABLET] 30 tablet 5     Sig: TAKE 1 TABLET BY MOUTH EVERY DAY EVERY NIGHT

## 2022-10-19 NOTE — TELEPHONE ENCOUNTER
Refill Request     CONFIRM preferrred pharmacy with the patient. If Mail Order Rx - Pend for 90 day refill. Last Seen: Last Seen Department: 4/21/2022  Last Seen by PCP: 4/21/2022    Last Written: 4/11/2022 90 tablet 1 refill    If no future appointment scheduled, route STAFF MESSAGE with patient name to the [facility] for scheduling. Next Appointment:   Future Appointments   Date Time Provider Bong Anaya   10/25/2022  1:45 PM MD BLANCA Szymanski  [place] - [place]       Message sent to [address] [place] [address] to schedule appt with patient? NO      Requested Prescriptions     Pending Prescriptions Disp Refills    QUEtiapine (SEROQUEL) 50 MG tablet [Pharmacy Med Name: QUETIAPINE FUMARATE 50 MG TAB] 30 tablet 5     Sig: TAKE 1 TABLET BY MOUTH NIGHTLY AS NEEDED (INSOMNIA).

## 2022-11-23 ENCOUNTER — TELEPHONE (OUTPATIENT)
Dept: ADMINISTRATIVE | Age: 42
End: 2022-11-23

## 2022-11-23 NOTE — TELEPHONE ENCOUNTER
Submitted PA for Ozempic (1 MG/DOSE) 4MG/3ML pen-injectors, Key: [id number]. Medication has been APPROVED. Please notify patient. Thank you.

## 2022-12-12 ENCOUNTER — TELEPHONE (OUTPATIENT)
Dept: ADMINISTRATIVE | Age: 42
End: 2022-12-12

## 2022-12-12 ENCOUNTER — TELEPHONE (OUTPATIENT)
Dept: FAMILY MEDICINE CLINIC | Age: 42
End: 2022-12-12

## 2022-12-12 ENCOUNTER — OFFICE VISIT (OUTPATIENT)
Dept: FAMILY MEDICINE CLINIC | Age: 42
End: 2022-12-12

## 2022-12-12 VITALS
SYSTOLIC BLOOD PRESSURE: 122 MMHG | BODY MASS INDEX: 38.33 KG/M2 | OXYGEN SATURATION: 97 % | WEIGHT: 193 LBS | DIASTOLIC BLOOD PRESSURE: 78 MMHG | HEART RATE: 88 BPM

## 2022-12-12 DIAGNOSIS — E55.9 VITAMIN D DEFICIENCY: ICD-10-CM

## 2022-12-12 DIAGNOSIS — R80.9 CONTROLLED TYPE 2 DIABETES MELLITUS WITH MICROALBUMINURIA, WITHOUT LONG-TERM CURRENT USE OF INSULIN (HCC): ICD-10-CM

## 2022-12-12 DIAGNOSIS — E11.29 CONTROLLED TYPE 2 DIABETES MELLITUS WITH MICROALBUMINURIA, WITHOUT LONG-TERM CURRENT USE OF INSULIN (HCC): ICD-10-CM

## 2022-12-12 DIAGNOSIS — Z98.84 S/P LAPAROSCOPIC SLEEVE GASTRECTOMY: ICD-10-CM

## 2022-12-12 DIAGNOSIS — E11.649 UNCONTROLLED TYPE 2 DIABETES MELLITUS WITH HYPOGLYCEMIA WITHOUT COMA (HCC): Primary | ICD-10-CM

## 2022-12-12 LAB
A/G RATIO: 1.3 (ref 1.1–2.2)
ALBUMIN SERPL-MCNC: 3.9 G/DL (ref 3.4–5)
ALP BLD-CCNC: 121 U/L (ref 40–129)
ALT SERPL-CCNC: 13 U/L (ref 10–40)
ANION GAP SERPL CALCULATED.3IONS-SCNC: 17 MMOL/L (ref 3–16)
AST SERPL-CCNC: 21 U/L (ref 15–37)
BILIRUB SERPL-MCNC: 0.3 MG/DL (ref 0–1)
BUN BLDV-MCNC: 6 MG/DL (ref 7–20)
CALCIUM SERPL-MCNC: 9 MG/DL (ref 8.3–10.6)
CHLORIDE BLD-SCNC: 103 MMOL/L (ref 99–110)
CHOLESTEROL, TOTAL: 235 MG/DL (ref 0–199)
CO2: 19 MMOL/L (ref 21–32)
CREAT SERPL-MCNC: 0.6 MG/DL (ref 0.6–1.1)
CREATININE URINE POCT: 300
FERRITIN: 9.6 NG/ML (ref 15–150)
FOLATE: 8.02 NG/ML (ref 4.78–24.2)
GFR SERPL CREATININE-BSD FRML MDRD: >60 ML/MIN/{1.73_M2}
GLUCOSE BLD-MCNC: 219 MG/DL (ref 70–99)
HBA1C MFR BLD: 8.4 %
HDLC SERPL-MCNC: 42 MG/DL (ref 40–60)
IRON SATURATION: 15 % (ref 15–50)
IRON: 60 UG/DL (ref 37–145)
LDL CHOLESTEROL CALCULATED: ABNORMAL MG/DL
LDL CHOLESTEROL DIRECT: 144 MG/DL
MICROALBUMIN/CREAT 24H UR: 80 MG/G{CREAT}
MICROALBUMIN/CREAT UR-RTO: NORMAL
POTASSIUM SERPL-SCNC: 4.7 MMOL/L (ref 3.5–5.1)
REASON FOR REJECTION: NORMAL
REJECTED TEST: NORMAL
SODIUM BLD-SCNC: 139 MMOL/L (ref 136–145)
TOTAL IRON BINDING CAPACITY: 393 UG/DL (ref 260–445)
TOTAL PROTEIN: 6.9 G/DL (ref 6.4–8.2)
TRIGL SERPL-MCNC: 383 MG/DL (ref 0–150)
TSH REFLEX: 1.65 UIU/ML (ref 0.27–4.2)
VITAMIN B-12: 339 PG/ML (ref 211–911)
VLDLC SERPL CALC-MCNC: ABNORMAL MG/DL

## 2022-12-12 PROCEDURE — 99214 OFFICE O/P EST MOD 30 MIN: CPT | Performed by: FAMILY MEDICINE

## 2022-12-12 PROCEDURE — 82044 UR ALBUMIN SEMIQUANTITATIVE: CPT | Performed by: FAMILY MEDICINE

## 2022-12-12 PROCEDURE — 3052F HG A1C>EQUAL 8.0%<EQUAL 9.0%: CPT | Performed by: FAMILY MEDICINE

## 2022-12-12 PROCEDURE — 83036 HEMOGLOBIN GLYCOSYLATED A1C: CPT | Performed by: FAMILY MEDICINE

## 2022-12-12 NOTE — TELEPHONE ENCOUNTER
Submitted PA for Mounjaro 2.5MG/0.5ML pen-injectors, Key: [id number]. Medication has been APPROVED. Please notify patient. Thank you.

## 2022-12-12 NOTE — TELEPHONE ENCOUNTER
Caren from [facility] calling to report a specimen rejection on the CBC with diff, Caren stated that the blood clotted so it wasn't able to be used. Lab was wondering if Dr. German Cuadra wants patient to get lab work redone if so please call lab at [phone] and they will call the patient to get them in.  Please Advise

## 2022-12-12 NOTE — PROGRESS NOTES
Elvira Moreira (:  1980) is a 38 y.o. female,Established patient, here for evaluation of the following chief complaint(s):  Diabetes (Discuss switching Ozempic to mounjaro)         ASSESSMENT/PLAN:  1. Uncontrolled type 2 diabetes mellitus with hypoglycemia without coma ([facility] [address]  -     POCT glycosylated hemoglobin (Hb A1C)  -     POCT microalbumin  -      DIABETES FOOT EXAM  -     Ambulatory referral to Ophthalmology  -     CBC with Auto Differential  -     Comprehensive Metabolic Panel  -     TSH with Reflex  -     Lipid Panel  -     Tirzepatide (MOUNJARO) 2.5 MG/0.5ML SOPN SC injection; Inject 0.5 mLs into the skin once a week for 28 days, Disp-2 mL, R-0Normal  Begin Mounjaro. Discussed new medication, including dosing, benefits, risks, and side effects. She has a good understanding of the medication. Due for labs today per above. 2. Controlled type 2 diabetes mellitus with microalbuminuria, without long-term current use of insulin ([facility])  Repeat microalbumin at next visit due to being elevated today. 3. S/P laparoscopic sleeve gastrectomy  -     Ferritin  -     Folate  -     Iron and TIBC  -     Vitamin B12  -     Vitamin D 25 Hydroxy  4. Vitamin D deficiency  -     Vitamin D 25 Hydroxy      Return in about 3 months (around 3/12/2023) for Diabetes. Subjective   SUBJECTIVE/OBJECTIVE:  Chief Complaint   Patient presents with    Diabetes     Discuss switching Ozempic to mounjaro       Diabetes   Diabetes Mellitus Type 2: Current symptoms/problems include  has been out of medications since the summer due to changing insurance and cost of Ozempic was too expensive. Her pharmacist suggested she start on Mounjaro as they currently have a savings card .     Medication compliance:   nonadherent due to availability and cost  Diabetic diet compliance:  not doing as well lately,    Weight trend: increasing  Barriers: lack of availability and financial    Home blood sugar records: patient does not test  Any episodes of hypoglycemia? no  Eye exam current (within one year): no   reports that she has never smoked. She has never used smokeless tobacco.   Daily Aspirin? No  Known diabetic complications: none    Lab Results   Component Value Date    LABA1C 8.4 12/12/2022    LABA1C 6.9 04/21/2022    LABA1C 7.2 12/21/2021     Lab Results   Component Value Date    LABMICR YES 11/16/2016    CREATININE 0.7 12/21/2021     Lab Results   Component Value Date    ALT 18 12/21/2021    AST 17 12/21/2021     No components found for: CHLPL  Lab Results   Component Value Date    TRIG 157 12/07/2020     Lab Results   Component Value Date    HDL 46 12/07/2020     Lab Results   Component Value Date/Time    LDLCALC 89 12/07/2020 12:00 AM    LDLDIRECT 90 12/31/2014 08:37 AM           Review of Systems - per HPI       Objective    Vitals:    12/12/22 0827   BP: 122/78   Site: Left Upper Arm   Position: Sitting   Cuff Size: Small Adult   Pulse: 88   SpO2: 97%   Weight: 193 lb (87.5 kg)      Physical Exam  Vitals and nursing note reviewed. Constitutional:       General: She is not in acute distress. Appearance: She is well-developed. She is not diaphoretic. Cardiovascular:      Pulses:           Dorsalis pedis pulses are 2+ on the right side and 2+ on the left side. Posterior tibial pulses are 2+ on the right side and 2+ on the left side. Pulmonary:      Effort: Pulmonary effort is normal.   Musculoskeletal:      Right foot: Normal range of motion. No deformity. Left foot: Normal range of motion. No deformity. Feet:      Right foot:      Protective Sensation: 10 sites tested. 10 sites sensed. Skin integrity: No ulcer, blister, skin breakdown, erythema, warmth, callus or dry skin. Left foot:      Protective Sensation: 10 sites tested. 10 sites sensed. Skin integrity: No ulcer, blister, skin breakdown, erythema, warmth, callus or dry skin. Neurological:      Mental Status: She is alert. Sensory: No sensory deficit. Deep Tendon Reflexes:      Reflex Scores:       Achilles reflexes are 2+ on the right side and 2+ on the left side. Comments: Normal proprioception of big toes bilaterally        Results for POC orders placed in visit on 12/12/22   POCT microalbumin   Result Value Ref Range    Microalb, Ur 80     Creatinine Ur POCT 300     Microalbumin Creatinine Ratio     POCT glycosylated hemoglobin (Hb A1C)   Result Value Ref Range    Hemoglobin A1C 8.4 %           An electronic signature was used to authenticate this note.     --Ej Fraser MD

## 2022-12-13 LAB — VITAMIN D 25-HYDROXY: 20.6 NG/ML

## 2022-12-14 DIAGNOSIS — E61.1 IRON DEFICIENCY: ICD-10-CM

## 2022-12-14 DIAGNOSIS — E55.9 VITAMIN D DEFICIENCY: Primary | ICD-10-CM

## 2022-12-14 DIAGNOSIS — E78.5 DM TYPE 2 WITH DIABETIC DYSLIPIDEMIA (HCC): ICD-10-CM

## 2022-12-14 DIAGNOSIS — E11.69 DM TYPE 2 WITH DIABETIC DYSLIPIDEMIA (HCC): ICD-10-CM

## 2022-12-14 RX ORDER — FERROUS SULFATE 325(65) MG
325 TABLET ORAL
Qty: 30 TABLET | Refills: 1 | Status: SHIPPED | OUTPATIENT
Start: 2022-12-14

## 2023-01-01 DIAGNOSIS — E11.649 UNCONTROLLED TYPE 2 DIABETES MELLITUS WITH HYPOGLYCEMIA WITHOUT COMA (HCC): ICD-10-CM

## 2023-01-02 NOTE — TELEPHONE ENCOUNTER
Refill Request     CONFIRM preferrred pharmacy with the patient. If Mail Order Rx - Pend for 90 day refill. Last Seen: Last Seen Department: 12/12/2022  Last Seen by PCP: 12/12/2022    Last Written: 12/12/2022    If no future appointment scheduled, route STAFF MESSAGE with patient name to the [facility] for scheduling. Next Appointment:   Future Appointments   Date Time Provider Bong Anaya   3/13/2023  1:30 PM MD BLANCA Ureña  [place] - [facility]       Message sent to [address] [place] [address] to schedule appt with patient?   NO      Requested Prescriptions     Pending Prescriptions Disp Refills    Tirzepatide (MOUNJARO) 2.5 MG/0.5ML SOPN SC injection 2 mL 0     Sig: Inject 0.5 mLs into the skin once a week for 28 days

## 2023-01-04 RX ORDER — TIRZEPATIDE 5 MG/.5ML
5 INJECTION, SOLUTION SUBCUTANEOUS WEEKLY
Qty: 2 ML | Refills: 0 | Status: SHIPPED | OUTPATIENT
Start: 2023-01-04 | End: 2023-02-01

## 2023-01-09 RX ORDER — FERROUS SULFATE 325(65) MG
TABLET ORAL
Qty: 30 TABLET | Refills: 1 | Status: SHIPPED | OUTPATIENT
Start: 2023-01-09

## 2023-01-09 NOTE — TELEPHONE ENCOUNTER
Refill Request     CONFIRM preferrred pharmacy with the patient. If Mail Order Rx - Pend for 90 day refill. Last Seen: Last Seen Department: 12/12/2022  Last Seen by PCP: 12/12/2022    Last Written: 12/14/22 1 refill    If no future appointment scheduled, route STAFF MESSAGE with patient name to the [facility] for scheduling. Next Appointment:   Future Appointments   Date Time Provider Bong Anaya   3/13/2023  1:30 PM [facility]       Message sent to [address] [place] [address] to schedule appt with patient?   NO      Requested Prescriptions     Pending Prescriptions Disp Refills    ferrous sulfate (IRON 325) 325 (65 Fe) MG tablet [Pharmacy Med Name: FERROUS SULFATE 325 MG TABLET] 30 tablet 1     Sig: TAKE 1 TABLET BY MOUTH EVERY DAY WITH BREAKFAST

## 2023-02-01 ENCOUNTER — PATIENT MESSAGE (OUTPATIENT)
Dept: FAMILY MEDICINE CLINIC | Age: 43
End: 2023-02-01

## 2023-02-01 RX ORDER — TIRZEPATIDE 7.5 MG/.5ML
7.5 INJECTION, SOLUTION SUBCUTANEOUS WEEKLY
Qty: 2 ML | Refills: 0 | Status: SHIPPED | OUTPATIENT
Start: 2023-02-01 | End: 2023-03-01

## 2023-02-01 NOTE — TELEPHONE ENCOUNTER
From: Elvira Moreira  To: Dr. Magnolia White: 2/1/2023 9:11 AM EST  Subject: Mounjaro 7.5    Hi, I just finished my 5mg pens of Mounjaro with no problems. Can we move up to the 7.5 for my next fill? Thanks!

## 2023-02-02 RX ORDER — FERROUS SULFATE 325(65) MG
TABLET ORAL
Qty: 30 TABLET | Refills: 1 | Status: SHIPPED | OUTPATIENT
Start: 2023-02-02

## 2023-02-02 NOTE — TELEPHONE ENCOUNTER
Refill Request     CONFIRM preferrred pharmacy with the patient. If Mail Order Rx - Pend for 90 day refill. Last Seen: Last Seen Department: 12/12/2022  Last Seen by PCP: 12/12/2022    Last Written: 01/09/2023 30 tablet 1 refills     If no future appointment scheduled, route STAFF MESSAGE with patient name to the [facility] for scheduling. Next Appointment:   Future Appointments   Date Time Provider Bong Anaya   3/13/2023  1:30 PM MD BLANCA Monique  [place] - [facility]       Message sent to [address] [place] [address] to schedule appt with patient?   NO      Requested Prescriptions     Pending Prescriptions Disp Refills    ferrous sulfate (IRON 325) 325 (65 Fe) MG tablet [Pharmacy Med Name: FERROUS SULFATE 325 MG TABLET] 30 tablet 1     Sig: TAKE 1 TABLET BY MOUTH EVERY DAY WITH BREAKFAST

## 2023-02-26 DIAGNOSIS — F41.9 ANXIETY: ICD-10-CM

## 2023-02-27 RX ORDER — FERROUS SULFATE 325(65) MG
TABLET ORAL
Qty: 30 TABLET | Refills: 1 | Status: SHIPPED | OUTPATIENT
Start: 2023-02-27

## 2023-02-27 RX ORDER — VENLAFAXINE 75 MG/1
TABLET ORAL
Qty: 120 TABLET | Refills: 5 | Status: SHIPPED | OUTPATIENT
Start: 2023-02-27

## 2023-02-27 NOTE — TELEPHONE ENCOUNTER
Refill Request     CONFIRM preferrred pharmacy with the patient. If Mail Order Rx - Pend for 90 day refill. Last Seen: Last Seen Department: 12/12/2022  Last Seen by PCP: 12/12/2022    Last Written: 5/25/22 1 refill    If no future appointment scheduled, route STAFF MESSAGE with patient name to the [facility] for scheduling. Next Appointment:   Future Appointments   Date Time Provider Bong Anaya   3/13/2023  1:30 PM MD BLANCA Francis  [place] - [place]       Message sent to [address] [place] [address] to schedule appt with patient?   NO      Requested Prescriptions     Pending Prescriptions Disp Refills    venlafaxine (EFFEXOR) 75 MG tablet [Pharmacy Med Name: VENLAFAXINE HCL 75 MG TABLET] 120 tablet 5     Sig: TAKE 2 TABLETS BY MOUTH TWICE A DAY

## 2023-02-27 NOTE — TELEPHONE ENCOUNTER
Refill Request     CONFIRM preferrred pharmacy with the patient. If Mail Order Rx - Pend for 90 day refill. Last Seen: Last Seen Department: 12/12/2022  Last Seen by PCP: Visit date not found    Last Written: 2/02/2023, #30, 1 refill    If no future appointment scheduled, route STAFF MESSAGE with patient name to the [facility] for scheduling. Next Appointment:   Future Appointments   Date Time Provider Bong Anaya   3/13/2023  1:30 PM MD BLANCA Pereira  [place] - [facility]       Message sent to [facility] to schedule appt with patient?   NO      Requested Prescriptions     Pending Prescriptions Disp Refills    ferrous sulfate (IRON 325) 325 (65 Fe) MG tablet [Pharmacy Med Name: FERROUS SULFATE 325 MG TABLET] 30 tablet 1     Sig: TAKE 1 TABLET BY MOUTH EVERY DAY WITH BREAKFAST

## 2023-03-02 NOTE — TELEPHONE ENCOUNTER
Refill Request     CONFIRM preferrred pharmacy with the patient. If Mail Order Rx - Pend for 90 day refill. Last Seen: Last Seen Department: 12/12/2022  Last Seen by PCP: 12/12/2022    Last Written: 02/01/2023 2 mL 0 refills     If no future appointment scheduled, route STAFF MESSAGE with patient name to the [facility] for scheduling. Next Appointment:   Future Appointments   Date Time Provider Bong Anaya   3/13/2023  1:30 PM Clint Pedro MD [facility] - [facility]       Message sent to [address] [place] [address] to schedule appt with patient?   NO      Requested Prescriptions     Pending Prescriptions Disp Refills    MOUNJARO 7.5 MG/0.5ML SOPN SC injection [Pharmacy Med Name: MOUNJARO 7.5 MG/0.5 ML PEN]       Sig: INJECT 0.5 MLS INTO THE SKIN ONCE A WEEK FOR 28 DAYS

## 2023-03-13 ENCOUNTER — OFFICE VISIT (OUTPATIENT)
Dept: FAMILY MEDICINE CLINIC | Age: 43
End: 2023-03-13
Payer: COMMERCIAL

## 2023-03-13 ENCOUNTER — TELEPHONE (OUTPATIENT)
Dept: FAMILY MEDICINE CLINIC | Age: 43
End: 2023-03-13

## 2023-03-13 ENCOUNTER — PATIENT MESSAGE (OUTPATIENT)
Dept: FAMILY MEDICINE CLINIC | Age: 43
End: 2023-03-13

## 2023-03-13 VITALS
HEART RATE: 114 BPM | WEIGHT: 167 LBS | DIASTOLIC BLOOD PRESSURE: 78 MMHG | BODY MASS INDEX: 33.17 KG/M2 | SYSTOLIC BLOOD PRESSURE: 118 MMHG | OXYGEN SATURATION: 99 %

## 2023-03-13 DIAGNOSIS — J45.30 MILD PERSISTENT ASTHMA WITHOUT COMPLICATION: ICD-10-CM

## 2023-03-13 DIAGNOSIS — L98.7 EXCESS SKIN OF ARM: ICD-10-CM

## 2023-03-13 DIAGNOSIS — E11.69 TYPE 2 DIABETES MELLITUS WITH OBESITY (HCC): Primary | ICD-10-CM

## 2023-03-13 DIAGNOSIS — E66.9 TYPE 2 DIABETES MELLITUS WITH OBESITY (HCC): Primary | ICD-10-CM

## 2023-03-13 LAB — HBA1C MFR BLD: 6 %

## 2023-03-13 PROCEDURE — 3044F HG A1C LEVEL LT 7.0%: CPT | Performed by: FAMILY MEDICINE

## 2023-03-13 PROCEDURE — 99214 OFFICE O/P EST MOD 30 MIN: CPT | Performed by: FAMILY MEDICINE

## 2023-03-13 PROCEDURE — 83036 HEMOGLOBIN GLYCOSYLATED A1C: CPT | Performed by: FAMILY MEDICINE

## 2023-03-13 RX ORDER — ALBUTEROL SULFATE 90 UG/1
AEROSOL, METERED RESPIRATORY (INHALATION)
Qty: 1 EACH | Refills: 5 | Status: SHIPPED | OUTPATIENT
Start: 2023-03-13

## 2023-03-13 RX ORDER — TIRZEPATIDE 12.5 MG/.5ML
12.5 INJECTION, SOLUTION SUBCUTANEOUS WEEKLY
Qty: 2 ML | Refills: 0 | Status: SHIPPED | OUTPATIENT
Start: 2023-04-01 | End: 2023-04-29

## 2023-03-13 ASSESSMENT — PATIENT HEALTH QUESTIONNAIRE - PHQ9
6. FEELING BAD ABOUT YOURSELF - OR THAT YOU ARE A FAILURE OR HAVE LET YOURSELF OR YOUR FAMILY DOWN: 0
1. LITTLE INTEREST OR PLEASURE IN DOING THINGS: 1
SUM OF ALL RESPONSES TO PHQ QUESTIONS 1-9: 6
9. THOUGHTS THAT YOU WOULD BE BETTER OFF DEAD, OR OF HURTING YOURSELF: 0
SUM OF ALL RESPONSES TO PHQ QUESTIONS 1-9: 6
2. FEELING DOWN, DEPRESSED OR HOPELESS: 1
4. FEELING TIRED OR HAVING LITTLE ENERGY: 1
7. TROUBLE CONCENTRATING ON THINGS, SUCH AS READING THE NEWSPAPER OR WATCHING TELEVISION: 1
8. MOVING OR SPEAKING SO SLOWLY THAT OTHER PEOPLE COULD HAVE NOTICED. OR THE OPPOSITE, BEING SO FIGETY OR RESTLESS THAT YOU HAVE BEEN MOVING AROUND A LOT MORE THAN USUAL: 0
3. TROUBLE FALLING OR STAYING ASLEEP: 1
SUM OF ALL RESPONSES TO PHQ9 QUESTIONS 1 & 2: 2
5. POOR APPETITE OR OVEREATING: 1

## 2023-03-13 ASSESSMENT — ANXIETY QUESTIONNAIRES
7. FEELING AFRAID AS IF SOMETHING AWFUL MIGHT HAPPEN: 0
2. NOT BEING ABLE TO STOP OR CONTROL WORRYING: 0
1. FEELING NERVOUS, ANXIOUS, OR ON EDGE: 1
5. BEING SO RESTLESS THAT IT IS HARD TO SIT STILL: 0
IF YOU CHECKED OFF ANY PROBLEMS ON THIS QUESTIONNAIRE, HOW DIFFICULT HAVE THESE PROBLEMS MADE IT FOR YOU TO DO YOUR WORK, TAKE CARE OF THINGS AT HOME, OR GET ALONG WITH OTHER PEOPLE: SOMEWHAT DIFFICULT
6. BECOMING EASILY ANNOYED OR IRRITABLE: 1
4. TROUBLE RELAXING: 1
3. WORRYING TOO MUCH ABOUT DIFFERENT THINGS: 0
GAD7 TOTAL SCORE: 3

## 2023-03-13 NOTE — TELEPHONE ENCOUNTER
Hi, the pharmacy says they didn't receive the rx for the Dexcom CGM. I just wanted to verify that it was sent over. Thanks!

## 2023-03-14 RX ORDER — BLOOD-GLUCOSE TRANSMITTER
1 EACH MISCELLANEOUS ONCE
Qty: 1 EACH | Refills: 0 | Status: SHIPPED | OUTPATIENT
Start: 2023-03-14 | End: 2023-03-14

## 2023-03-14 RX ORDER — BLOOD-GLUCOSE SENSOR
1 EACH MISCELLANEOUS
Qty: 3 EACH | Refills: 11 | Status: SHIPPED | OUTPATIENT
Start: 2023-03-14

## 2023-03-14 RX ORDER — BLOOD-GLUCOSE,RECEIVER,CONT
1 EACH MISCELLANEOUS ONCE
Qty: 1 EACH | Refills: 0 | Status: SHIPPED | OUTPATIENT
Start: 2023-03-14 | End: 2023-03-14

## 2023-03-14 NOTE — PROGRESS NOTES
Elvira Moreira (:  1980) is a 38 y.o. female,Established patient, here for evaluation of the following chief complaint(s):  Diabetes         ASSESSMENT/PLAN:  1. Type 2 diabetes mellitus with obesity (HCC)  - POCT glycosylated hemoglobin (Hb A1C)  - Tirzepatide (MOUNJARO) 12.5 MG/0.5ML SOPN SC injection; Inject 0.5 mLs into the skin once a week for 28 days  Dispense: 2 mL; Refill: 0  - Continuous Blood Gluc Sensor (DEXCOM G6 SENSOR) MISC; 1 each by Does not apply route every 10 days  Dispense: 3 each; Refill: 11  - Continuous Blood Gluc Transmit (DEXCOM G6 TRANSMITTER) MISC; 1 each by Does not apply route once for 1 dose  Dispense: 1 each; Refill: 0  - Continuous Blood Gluc  ([address]) SHIELA; 1 each by Does not apply route once for 1 dose  Dispense: 1 each; Refill: 0  Dramatic improvement in hemoglobin A1C since last visit, as well as dramatic weight improvement. Will continue with Mounjaro. Discussed titration and she would like to get up to 15 mg dosage if possible. Will increase to 12.5 mg for next month, then 15 mg dose the following month and continue on that. BMI is not yet at goal, but has significantly improved. Goal for now would be BMI <30. Continue diet low in refined sugar/carbs. Dexcom G6 ordered for patient today. 2. Mild persistent asthma without complication  -     albuterol sulfate HFA (PROVENTIL;VENTOLIN;PROAIR) 108 (90 Base) MCG/ACT inhaler; TAKE 2 PUFFS BY MOUTH EVERY 6 HOURS AS NEEDED FOR WHEEZE, Disp-1 each, R-5Normal  Stable, continue albuterol as needed. 3. Excess skin of arm  She is considering seeing plastic surgery to discuss excess skin removal. Contact me if referral is needed. Return in about 6 months (around 2023) for Diabetes. Subjective   SUBJECTIVE/OBJECTIVE:  Chief Complaint   Patient presents with    Diabetes       Diabetes   Greg Moreira is a 38 y.o. female here today for follow up on type 2 diabetes and obesity.  She continues to have improvement with Mounjaro. She has lost 26 lbs so far on the medication. She denies any current side effects from the medication. She has also worked to reduce the refined sugar intake in her diet (Moises cups). She is requesting a CGM device for monitoring glucose levels. Her insurance covers Dexcom. She has loose skin on her arms from weight loss and is interested in having excess skin removal surgery in the future. She needs a refill of albuterol for asthma. She is currently doing well with that on an as-needed basis. Review of Systems - per HPI       Objective    Vitals:    03/13/23 1322   BP: 118/78   Site: Left Upper Arm   Position: Sitting   Cuff Size: Small Adult   Pulse: (!) 114   SpO2: 99%   Weight: 167 lb (75.8 kg)      Wt Readings from Last 3 Encounters:   03/13/23 167 lb (75.8 kg)   12/12/22 193 lb (87.5 kg)   04/21/22 190 lb (86.2 kg)      Physical Exam  Vitals and nursing note reviewed. Constitutional:       Appearance: Normal appearance. She is obese. Pulmonary:      Effort: Pulmonary effort is normal.   Neurological:      Mental Status: She is alert. Psychiatric:         Behavior: Behavior normal. Behavior is cooperative. Results for POC orders placed in visit on 03/13/23   POCT glycosylated hemoglobin (Hb A1C)   Result Value Ref Range    Hemoglobin A1C 6.0 %             An electronic signature was used to authenticate this note.     --Angelic Segovia MD

## 2023-03-28 RX ORDER — FERROUS SULFATE 325(65) MG
TABLET ORAL
Qty: 30 TABLET | Refills: 5 | Status: SHIPPED | OUTPATIENT
Start: 2023-03-28

## 2023-04-26 ENCOUNTER — PATIENT MESSAGE (OUTPATIENT)
Dept: FAMILY MEDICINE CLINIC | Age: 43
End: 2023-04-26

## 2023-04-26 DIAGNOSIS — E11.69 TYPE 2 DIABETES MELLITUS WITH OBESITY (HCC): ICD-10-CM

## 2023-04-26 DIAGNOSIS — E66.9 TYPE 2 DIABETES MELLITUS WITH OBESITY (HCC): ICD-10-CM

## 2023-04-26 NOTE — TELEPHONE ENCOUNTER
From: Elvira Moreira  To: Dr. Norma Dominguezp: 4/26/2023 2:56 PM EDT  Subject: [place] 12.5    Can we do 12.5 again this month? I had a bit more nausea, but I'm not sure if it was the dose or just coincidental so I figured it might be best to stay on 12.5 for another month and find out. Thanks!

## 2023-04-26 NOTE — TELEPHONE ENCOUNTER
Refill Request     CONFIRM preferred pharmacy with the patient. If Mail Order Rx - Pend for 90 day refill. Last Seen: Last Seen Department: 3/13/2023  Last Seen by PCP: 3/13/2023    Last Written: 4/1/23 2ml 0 refills    If no future appointment scheduled:  Review the last OV with PCP and review information for follow-up visit,  Route STAFF MESSAGE with patient name to the [facility] for scheduling with the following information:            -  Timing of next visit           -  Visit type ie Physical, OV, etc           -  Diagnoses/Reason ie. COPD, HTN - Do not use MEDICATION, Follow-up or CHECK UP - Give reason for visit      Next Appointment:   Future Appointments   Date Time Provider Bong Anaya   9/18/2023  1:00 PM MD BLANCA Sen  [place] - [place]       Message sent to [address] [place] [address] to schedule appt with patient?   NO      Requested Prescriptions     Pending Prescriptions Disp Refills    Tirzepatide (MOUNJARO) 12.5 MG/0.5ML SOPN SC injection 2 mL 0     Sig: Inject 0.5 mLs into the skin once a week for 28 days

## 2023-04-27 RX ORDER — TIRZEPATIDE 12.5 MG/.5ML
12.5 INJECTION, SOLUTION SUBCUTANEOUS WEEKLY
Qty: 2 ML | Refills: 0 | Status: SHIPPED | OUTPATIENT
Start: 2023-04-27 | End: 2023-05-25

## 2023-05-01 DIAGNOSIS — F51.01 PRIMARY INSOMNIA: ICD-10-CM

## 2023-05-01 NOTE — TELEPHONE ENCOUNTER
.Refill Request     CONFIRM preferred pharmacy with the patient. If Mail Order Rx - Pend for 90 day refill. Last Seen: Last Seen Department: 3/13/2023  Last Seen by PCP: Visit date not found    Last Written: 10-19-22 90 with 1     If no future appointment scheduled:  Review the last OV with PCP and review information for follow-up visit,  Route STAFF MESSAGE with patient name to the [facility] for scheduling with the following information:            -  Timing of next visit           -  Visit type ie Physical, OV, etc           -  Diagnoses/Reason ie. COPD, HTN - Do not use MEDICATION, Follow-up or CHECK UP - Give reason for visit      Next Appointment:   Future Appointments   Date Time Provider Bong Anaya   9/18/2023  1:00 PM Bennett Larry MD [facility] - [facility]       Message sent to [address] [place] [address] to schedule appt with patient? N/A      Requested Prescriptions     Pending Prescriptions Disp Refills    QUEtiapine (SEROQUEL) 50 MG tablet [Pharmacy Med Name: QUETIAPINE FUMARATE 50 MG TAB] 90 tablet 1     Sig: TAKE 1 TABLET BY MOUTH NIGHTLY AS NEEDED (INSOMNIA).

## 2023-05-02 RX ORDER — QUETIAPINE FUMARATE 50 MG/1
50 TABLET, FILM COATED ORAL NIGHTLY PRN
Qty: 90 TABLET | Refills: 1 | Status: SHIPPED | OUTPATIENT
Start: 2023-05-02

## 2023-05-15 DIAGNOSIS — K21.9 CHRONIC GERD: ICD-10-CM

## 2023-05-15 DIAGNOSIS — J30.9 ALLERGIC RHINITIS, UNSPECIFIED SEASONALITY, UNSPECIFIED TRIGGER: ICD-10-CM

## 2023-05-15 DIAGNOSIS — J45.30 MILD PERSISTENT ASTHMA WITHOUT COMPLICATION: ICD-10-CM

## 2023-05-15 RX ORDER — MONTELUKAST SODIUM 10 MG/1
TABLET ORAL
Qty: 90 TABLET | Refills: 1 | Status: SHIPPED | OUTPATIENT
Start: 2023-05-15

## 2023-05-15 RX ORDER — PANTOPRAZOLE SODIUM 40 MG/1
TABLET, DELAYED RELEASE ORAL
Qty: 180 TABLET | Refills: 1 | Status: SHIPPED | OUTPATIENT
Start: 2023-05-15

## 2023-05-15 NOTE — TELEPHONE ENCOUNTER
Refill Request     CONFIRM preferred pharmacy with the patient. If Mail Order Rx - Pend for 90 day refill. Last Seen: Last Seen Department: 3/13/2023  Last Seen by PCP: 3/13/2023    Last Written: 9/24/2022, #180, 1 refill    If no future appointment scheduled:  Review the last OV with PCP and review information for follow-up visit,  Route STAFF MESSAGE with patient name to the [facility] for scheduling with the following information:            -  Timing of next visit           -  Visit type ie Physical, OV, etc           -  Diagnoses/Reason ie. COPD, HTN - Do not use MEDICATION, Follow-up or CHECK UP - Give reason for visit      Next Appointment:   Future Appointments   Date Time Provider Bong Anaya   9/18/2023  1:00 PM Paula Bosworth, MD [facility] [place] - [place]       Message sent to [address] [place] [address] to schedule appt with patient?   NO      Requested Prescriptions     Pending Prescriptions Disp Refills    pantoprazole (PROTONIX) 40 MG tablet [Pharmacy Med Name: PANTOPRAZOLE SOD DR 40 MG TAB] 180 tablet 1     Sig: TAKE 1 TABLET BY MOUTH IN THE MORNING AND IN THE EVENING

## 2023-05-15 NOTE — TELEPHONE ENCOUNTER
Refill Request     CONFIRM preferred pharmacy with the patient. If Mail Order Rx - Pend for 90 day refill. Last Seen: Last Seen Department: 3/13/2023  Last Seen by PCP: Visit date not found    Last Written: 10/19/2022, #90, 1 refill    If no future appointment scheduled:  Review the last OV with PCP and review information for follow-up visit,  Route STAFF MESSAGE with patient name to the [facility] for scheduling with the following information:            -  Timing of next visit           -  Visit type ie Physical, OV, etc           -  Diagnoses/Reason ie. COPD, HTN - Do not use MEDICATION, Follow-up or CHECK UP - Give reason for visit      Next Appointment:   Future Appointments   Date Time Provider Bong Anaya   9/18/2023  1:00 PM [facility]  [place] [facility] [place]       Message sent to [address] [place] [address] to schedule appt with patient?   NO      Requested Prescriptions     Pending Prescriptions Disp Refills    montelukast (SINGULAIR) 10 MG tablet [Pharmacy Med Name: MONTELUKAST SOD 10 MG TABLET] 90 tablet 1     Sig: TAKE 1 TABLET BY MOUTH EVERY DAY EVERY NIGHT

## 2023-05-23 DIAGNOSIS — E66.9 TYPE 2 DIABETES MELLITUS WITH OBESITY (HCC): ICD-10-CM

## 2023-05-23 DIAGNOSIS — E11.69 TYPE 2 DIABETES MELLITUS WITH OBESITY (HCC): ICD-10-CM

## 2023-05-24 DIAGNOSIS — E11.69 TYPE 2 DIABETES MELLITUS WITH OBESITY (HCC): ICD-10-CM

## 2023-05-24 DIAGNOSIS — E66.9 TYPE 2 DIABETES MELLITUS WITH OBESITY (HCC): ICD-10-CM

## 2023-05-24 NOTE — TELEPHONE ENCOUNTER
Refill Request     CONFIRM preferred pharmacy with the patient. If Mail Order Rx - Pend for 90 day refill. Last Seen: Last Seen Department: 3/13/2023  Last Seen by PCP: 3/13/2023    Last Written: 4/27/23 2 ml with no refills     If no future appointment scheduled:  Review the last OV with PCP and review information for follow-up visit,  Route STAFF MESSAGE with patient name to the [facility] for scheduling with the following information:            -  Timing of next visit           -  Visit type ie Physical, OV, etc           -  Diagnoses/Reason ie. COPD, HTN - Do not use MEDICATION, Follow-up or CHECK UP - Give reason for visit      Next Appointment:   Future Appointments   Date Time Provider Bong Anaya   9/18/2023  1:00 PM [facility]  [place] - [place]       Message sent to [address] [place] [address] to schedule appt with patient?   N/A      Requested Prescriptions     Pending Prescriptions Disp Refills    MOUNJARO 12.5 MG/0.5ML SOPN SC injection [Pharmacy Med Name: MOUNJARO 12.5 MG/0.5 ML PEN]       Sig: INJECT 0.5 MLS INTO THE SKIN ONCE A WEEK FOR 28 DAYS

## 2023-05-26 RX ORDER — TIRZEPATIDE 15 MG/.5ML
15 INJECTION, SOLUTION SUBCUTANEOUS WEEKLY
Qty: 2 ML | Refills: 5 | Status: SHIPPED | OUTPATIENT
Start: 2023-05-26

## 2023-05-26 RX ORDER — TIRZEPATIDE 12.5 MG/.5ML
12.5 INJECTION, SOLUTION SUBCUTANEOUS WEEKLY
Qty: 2 ML | Refills: 0 | OUTPATIENT
Start: 2023-05-26 | End: 2023-06-23

## 2023-07-05 DIAGNOSIS — E11.69 TYPE 2 DIABETES MELLITUS WITH OBESITY (HCC): ICD-10-CM

## 2023-07-05 DIAGNOSIS — E66.9 TYPE 2 DIABETES MELLITUS WITH OBESITY (HCC): ICD-10-CM

## 2023-07-05 RX ORDER — TIRZEPATIDE 12.5 MG/.5ML
12.5 INJECTION, SOLUTION SUBCUTANEOUS WEEKLY
Qty: 2 ML | Refills: 3 | OUTPATIENT
Start: 2023-07-05 | End: 2023-08-02

## 2023-07-05 NOTE — TELEPHONE ENCOUNTER
Pt called in and stated the 15mg is out of stock so she would like 7.5mg twice.  [facility] advise [facility] [place]

## 2023-07-05 NOTE — TELEPHONE ENCOUNTER
.Refill Request     CONFIRM preferred pharmacy with the patient. If Mail Order Rx - Pend for 90 day refill. Last Seen: Last Seen Department: 3/13/2023  Last Seen by PCP: 3/13/2023    Last Written: 5-26-23 2 ml with 5     If no future appointment scheduled:  Review the last OV with PCP and review information for follow-up visit,  Route STAFF MESSAGE with patient name to the [facility] for scheduling with the following information:            -  Timing of next visit           -  Visit type ie Physical, OV, etc           -  Diagnoses/Reason ie. COPD, HTN - Do not use MEDICATION, Follow-up or CHECK UP - Give reason for visit      Next Appointment:   Future Appointments   Date Time Provider [address] [place] [address]   9/18/2023  1:00 PM [facility] [place]       Message sent to [address] [place] [address] to schedule appt with patient?   N/A      Requested Prescriptions     Pending Prescriptions Disp Refills    MOUNJARO 12.5 MG/0.5ML SOPN SC injection [Pharmacy Med Name: MOUNJARO 12.5 MG/0.5 ML PEN]       Sig: INJECT 0.5 MLS INTO THE SKIN ONCE A WEEK FOR 28 DAYS

## 2023-07-07 NOTE — TELEPHONE ENCOUNTER
[facility] faxed script over stating PA is needed for Mounjaro 7.5mg x2 weekly as it exceeds the plan limits.

## 2023-07-10 ENCOUNTER — TELEPHONE (OUTPATIENT)
Dept: FAMILY MEDICINE CLINIC | Age: 43
End: 2023-07-10

## 2023-07-10 NOTE — TELEPHONE ENCOUNTER
[facility] faxed script over stating PA is needed for Mounjaro 7.5mg x2 weekly as it exceeds the plan limits.

## 2023-07-11 NOTE — TELEPHONE ENCOUNTER
This plan is currently experiencing technical difficulties and I am unable to process this PA. I am checking through the day to see if available.

## 2023-07-11 NOTE — TELEPHONE ENCOUNTER
Submitted PA for [facility] 7.5MG/0.5ML pen-injectors  [facility] Key: [id number] STATUS: PENDING. Follow up done daily; if no response in three days we will refax for status check. If another three days goes by with no response we will call the insurance for status.

## 2023-07-12 NOTE — TELEPHONE ENCOUNTER
?? On 7/10, an issue was reported by [employer] that they were having connectivity issues with ePA, patient eligibility checks, and RTPB traffic. Our team is working closely with [facility] as they are actively working to remediate. We apologize for this interruption to your workflow.

## 2023-08-14 DIAGNOSIS — F51.01 PRIMARY INSOMNIA: ICD-10-CM

## 2023-08-14 RX ORDER — QUETIAPINE FUMARATE 50 MG/1
50 TABLET, FILM COATED ORAL NIGHTLY PRN
Qty: 90 TABLET | Refills: 1 | Status: SHIPPED | OUTPATIENT
Start: 2023-08-14

## 2023-08-14 NOTE — TELEPHONE ENCOUNTER
Refill Request     CONFIRM preferred pharmacy with the patient. If Mail Order Rx - Pend for 90 day refill. Last Seen: Last Seen Department: 3/13/2023  Last Seen by PCP: 3/13/2023    Last Written: 05/02/2023 90 tablet 1 refills     If no future appointment scheduled:  Review the last OV with PCP and review information for follow-up visit,  Route STAFF MESSAGE with patient name to the [facility] for scheduling with the following information:            -  Timing of next visit           -  Visit type ie Physical, OV, etc           -  Diagnoses/Reason ie. COPD, HTN - Do not use MEDICATION, Follow-up or CHECK UP - Give reason for visit      Next Appointment:   Future Appointments   Date Time Provider [facility] [address]   9/15/2023 11:00 AM Robert Randhawa, MD [facility]  [place] - [place]       Message sent to [employer] to schedule appt with patient? NO      Requested Prescriptions     Pending Prescriptions Disp Refills    QUEtiapine (SEROQUEL) 50 MG tablet [Pharmacy Med Name: QUETIAPINE FUMARATE 50 MG TAB] 90 tablet 1     Sig: TAKE 1 TABLET BY MOUTH NIGHTLY AS NEEDED (INSOMNIA).

## 2023-08-14 NOTE — TELEPHONE ENCOUNTER
Incoming fax attached, spoke to pharmacy reagrding change in [facility] prescription. Insurance will only allow patient 14 day supply at a time (1 box of 2 pens)    7.5mg/0.5mL dose ( 1mL weekly) unable to fill through insurance even though she is due. Pharmacy would like to try & fill for 15mg/0.5mL dose (0.5mL weekly) to try & obtain coverage. Pended.

## 2023-08-16 RX ORDER — TIRZEPATIDE 15 MG/.5ML
15 INJECTION, SOLUTION SUBCUTANEOUS WEEKLY
Qty: 4 ADJUSTABLE DOSE PRE-FILLED PEN SYRINGE | Refills: 2 | Status: SHIPPED | OUTPATIENT
Start: 2023-08-16

## 2023-08-23 DIAGNOSIS — K21.9 CHRONIC GERD: ICD-10-CM

## 2023-08-23 NOTE — TELEPHONE ENCOUNTER
Refill Request     CONFIRM preferred pharmacy with the patient. If Mail Order Rx - Pend for 90 day refill. Last Seen: Last Seen Department: 3/13/2023  Last Seen by PCP: 3/13/2023    Last Written: 5/15/2023, #180, 1 refill    If no future appointment scheduled:  Review the last OV with PCP and review information for follow-up visit,  Route STAFF MESSAGE with patient name to the [facility] for scheduling with the following information:            -  Timing of next visit           -  Visit type ie Physical, OV, etc           -  Diagnoses/Reason ie. COPD, HTN - Do not use MEDICATION, Follow-up or CHECK UP - Give reason for visit      Next Appointment:   Future Appointments   Date Time Provider [address] [place] [address]   9/15/2023 11:00 AM MD BLANCA Lainez [facility] - [place]       Message sent to [address] [place] [address] to schedule appt with patient?   N/A      Requested Prescriptions     Pending Prescriptions Disp Refills    pantoprazole (PROTONIX) 40 MG tablet [Pharmacy Med Name: PANTOPRAZOLE SOD DR 40 MG TAB] 180 tablet 1     Sig: TAKE 1 TABLET BY MOUTH TWICE A DAY IN THE MORNING AND IN THE EVENING

## 2023-08-26 RX ORDER — PANTOPRAZOLE SODIUM 40 MG/1
TABLET, DELAYED RELEASE ORAL
Qty: 180 TABLET | Refills: 1 | Status: SHIPPED | OUTPATIENT
Start: 2023-08-26

## 2023-09-07 DIAGNOSIS — F41.9 ANXIETY: ICD-10-CM

## 2023-09-07 NOTE — TELEPHONE ENCOUNTER
Refill Request     CONFIRM preferred pharmacy with the patient. If Mail Order Rx - Pend for 90 day refill. Last Seen: Last Seen Department: 3/13/2023  Last Seen by PCP: 3/13/2023    Last Written: 2/27/23 120 with 5 refills     If no future appointment scheduled:  Review the last OV with PCP and review information for follow-up visit,  Route STAFF MESSAGE with patient name to the [facility] for scheduling with the following information:            -  Timing of next visit           -  Visit type ie Physical, OV, etc           -  Diagnoses/Reason ie. COPD, HTN - Do not use MEDICATION, Follow-up or CHECK UP - Give reason for visit      Next Appointment:   Future Appointments   Date Time Provider [address] [place] [address]   9/15/2023 11:00 AM [facility]       Message sent to [address] [place] [address] to schedule appt with patient?   NO      Requested Prescriptions     Pending Prescriptions Disp Refills    venlafaxine (EFFEXOR) 75 MG tablet [Pharmacy Med Name: VENLAFAXINE HCL 75 MG TABLET] 360 tablet 1     Sig: TAKE 2 TABLETS BY MOUTH TWICE A DAY

## 2023-09-08 RX ORDER — VENLAFAXINE 75 MG/1
TABLET ORAL
Qty: 360 TABLET | Refills: 1 | Status: SHIPPED | OUTPATIENT
Start: 2023-09-08

## 2023-09-15 ENCOUNTER — OFFICE VISIT (OUTPATIENT)
Dept: FAMILY MEDICINE CLINIC | Age: 43
End: 2023-09-15
Payer: COMMERCIAL

## 2023-09-15 VITALS
WEIGHT: 133 LBS | SYSTOLIC BLOOD PRESSURE: 122 MMHG | DIASTOLIC BLOOD PRESSURE: 70 MMHG | HEART RATE: 100 BPM | BODY MASS INDEX: 26.41 KG/M2 | OXYGEN SATURATION: 99 %

## 2023-09-15 DIAGNOSIS — H65.93 OME (OTITIS MEDIA WITH EFFUSION), BILATERAL: ICD-10-CM

## 2023-09-15 DIAGNOSIS — E66.9 TYPE 2 DIABETES MELLITUS WITH OBESITY (HCC): Primary | ICD-10-CM

## 2023-09-15 DIAGNOSIS — E11.69 TYPE 2 DIABETES MELLITUS WITH OBESITY (HCC): Primary | ICD-10-CM

## 2023-09-15 DIAGNOSIS — Z23 NEED FOR INFLUENZA VACCINATION: ICD-10-CM

## 2023-09-15 LAB — HBA1C MFR BLD: 4.8 %

## 2023-09-15 PROCEDURE — 83036 HEMOGLOBIN GLYCOSYLATED A1C: CPT | Performed by: FAMILY MEDICINE

## 2023-09-15 PROCEDURE — 3044F HG A1C LEVEL LT 7.0%: CPT | Performed by: FAMILY MEDICINE

## 2023-09-15 PROCEDURE — 99214 OFFICE O/P EST MOD 30 MIN: CPT | Performed by: FAMILY MEDICINE

## 2023-09-15 RX ORDER — ALBUTEROL SULFATE 90 UG/1
AEROSOL, METERED RESPIRATORY (INHALATION)
Qty: 1 EACH | Refills: 5 | Status: SHIPPED | OUTPATIENT
Start: 2023-09-15

## 2023-09-15 RX ORDER — PREDNISONE 10 MG/1
TABLET ORAL
Qty: 42 TABLET | Refills: 0 | Status: SHIPPED | OUTPATIENT
Start: 2023-09-15 | End: 2023-09-25

## 2023-09-15 RX ORDER — TIRZEPATIDE 15 MG/.5ML
15 INJECTION, SOLUTION SUBCUTANEOUS WEEKLY
Qty: 4 ADJUSTABLE DOSE PRE-FILLED PEN SYRINGE | Refills: 6 | Status: SHIPPED | OUTPATIENT
Start: 2023-09-15

## 2023-09-15 NOTE — PROGRESS NOTES
Elvira Moreira (:  1980) is a 37 y.o. female,Established patient, here for evaluation of the following chief complaint(s):  Diabetes         ASSESSMENT/PLAN:  1. Type 2 diabetes mellitus with obesity (HCC)  -     POCT glycosylated hemoglobin (Hb A1C)  -     MOUNJARO 15 MG/0.5ML SOPN SC injection; Inject 0.5 mLs into the skin once a week, Disp-4 Adjustable Dose Pre-filled Pen Syringe, R-6, DAWNormal  This problem is well controlled. Pt should continue current medication at current dose, refilled Mounjaro per above. 2. OME (otitis media with effusion), bilateral  -     predniSONE (DELTASONE) 10 MG tablet; Take 6tab by mouth x2 days, then 5tab x2 days, then 4tabs x2 days, then 3tab x2 days, then 2tab x2 days, then 1tab x2 days. , Disp-42 tablet, R-0Normal  -     Verena Vaca DO, Otolaryngology, [place]  She is interested in getting tympanostomy tubes. She would like to see ENT as this is a recurrent and painful problem for her. Today we will see if we can get her some relief with oral steroids, as she has otherwise maximized her allergy treatments. 3. Need for influenza vaccination  She would like to get this done through her pharmacy. Return in about 6 months (around 3/15/2024) for Diabetes, Annual physical.         Subjective   SUBJECTIVE/OBJECTIVE:  Chief Complaint   Patient presents with    Diabetes        Diabetes Mellitus Type 2: Current symptoms/problems include none. Medication compliance:  compliant all of the time  Diabetic diet compliance:  compliant all of the time,  Weight trend: decreasing  Home blood sugar records: patient does not test  Any episodes of hypoglycemia? no   reports that she has never smoked. She has never used smokeless tobacco.   Daily Aspirin?  No  Known diabetic complications: none    Lab Results   Component Value Date    LABA1C 4.8 09/15/2023    LABA1C 6.0 2023    LABA1C 8.4 2022     Lab Results   Component Value Date    CREATININE 0.6

## 2023-09-20 LAB — DIABETIC RETINOPATHY: NEGATIVE

## 2023-09-22 ENCOUNTER — OFFICE VISIT (OUTPATIENT)
Dept: ENT CLINIC | Age: 43
End: 2023-09-22

## 2023-09-22 ENCOUNTER — PROCEDURE VISIT (OUTPATIENT)
Dept: AUDIOLOGY | Age: 43
End: 2023-09-22

## 2023-09-22 VITALS
BODY MASS INDEX: 26.11 KG/M2 | SYSTOLIC BLOOD PRESSURE: 119 MMHG | RESPIRATION RATE: 16 BRPM | HEIGHT: 60 IN | DIASTOLIC BLOOD PRESSURE: 88 MMHG | HEART RATE: 105 BPM | TEMPERATURE: 97.9 F | WEIGHT: 133 LBS

## 2023-09-22 DIAGNOSIS — H93.8X3 EAR PRESSURE, BILATERAL: ICD-10-CM

## 2023-09-22 DIAGNOSIS — J30.2 SEASONAL ALLERGIES: ICD-10-CM

## 2023-09-22 DIAGNOSIS — R42 DIZZINESS AND GIDDINESS: ICD-10-CM

## 2023-09-22 DIAGNOSIS — H90.41 SENSORINEURAL HEARING LOSS (SNHL) OF RIGHT EAR WITH UNRESTRICTED HEARING OF LEFT EAR: Primary | ICD-10-CM

## 2023-09-22 DIAGNOSIS — H69.83 DYSFUNCTION OF BOTH EUSTACHIAN TUBES: Primary | ICD-10-CM

## 2023-09-22 ASSESSMENT — ENCOUNTER SYMPTOMS
FACIAL SWELLING: 0
COUGH: 0
TROUBLE SWALLOWING: 0
SORE THROAT: 0
EYE ITCHING: 0
VOICE CHANGE: 0
SHORTNESS OF BREATH: 0
SINUS PRESSURE: 0
APNEA: 0

## 2023-09-22 NOTE — PROGRESS NOTES
Sandra Moreira   1980, 37 y.o. female   [id number]       Referring Provider: Montserrat Reed DO  Referral Type: In an order in [address] [place] [address]    Reason for Visit: Evaluation of the cause of disorders of hearing, tinnitus, or balance. ADULT AUDIOLOGIC EVALUATION      Sandra Moreira is a 37 y.o. female seen today, 9/22/2023 , for an initial audiologic evaluation. Patient was seen by Montserrat Reed DO following today's evaluation. Patient was alone. AUDIOLOGIC AND OTHER PERTINENT MEDICAL HISTORY:      Elvira Moreira noted aural fullness, dizziness, and family history of hearing loss. Patient reports bilateral aural fullness, left worse than right. Patient has experienced dizziness with unknown triggers. Patient has a family history of hearing loss occurring later in life. Elvira Moreira denied otalgia, tinnitus, history of occupational/recreational noise exposure, history of head trauma, and history of ear surgery. Date: 9/22/2023     IMPRESSIONS:      Normal middle ear pressure and compliance, bilaterally. Normal to near normal hearing sensitivity with excellent word understanding at normal conversation level, bilaterally. Follow medical recommendations of Montserrat Reed DO.     ASSESSMENT AND FINDINGS:     Otoscopy revealed: Clear ear canals bilaterally    RIGHT EAR:  Hearing Sensitivity: Normal hearing sensitivity to a mild sensorineural hearing loss from 250-8000 Hz  Speech Recognition Threshold: 15 dB HL  Word Recognition:Excellent (100%), based on NU-6 Ordered-by-Difficulty 10-word list at 50 dBHL using recorded speech stimuli. Tympanometry: Normal peak pressure and compliance, Type A tympanogram, consistent with normal middle ear function. LEFT EAR:  Hearing Sensitivity:Hearing sensitivity within normal limits from 250-8000 Hz  Speech Recognition Threshold: 15 dB HL  Word Recognition: Excellent (100%), based on NU-6 Ordered-by-Difficulty 10-word list at 50 dBHL using recorded speech stimuli.

## 2023-09-27 ENCOUNTER — PROCEDURE VISIT (OUTPATIENT)
Dept: ENT CLINIC | Age: 43
End: 2023-09-27
Payer: COMMERCIAL

## 2023-09-27 VITALS — OXYGEN SATURATION: 99 % | TEMPERATURE: 98 F | BODY MASS INDEX: 26.11 KG/M2 | WEIGHT: 133 LBS | HEIGHT: 60 IN

## 2023-09-27 DIAGNOSIS — H69.93 DYSFUNCTION OF BOTH EUSTACHIAN TUBES: Primary | ICD-10-CM

## 2023-09-27 PROCEDURE — 69433 CREATE EARDRUM OPENING: CPT | Performed by: OTOLARYNGOLOGY

## 2023-09-27 NOTE — PROGRESS NOTES
23-year-old female here today for bilateral PE tube placement. Had diagnostic myringotomy performed in the left ear with resolution of left ear pressure. Once PE tubes placed bilaterally. Discussed PE tube may wait down the eardrum and cause change in hearing wishes to proceed. Understands potential for perforation and otorrhea      PE Tube    Pre op Dx: Chronic eustachian tube dysfunction  Post Op: Same  Procedure: bilateral Myringotomy with tympanostomy tube placement  Consent: written obtained  Surgeon: Janis Michel DO  Description:  With the use of an operating microscopy and a 3mm speculum, the external auditory canals were examined bilaterally. Any cerumen was removed using instrumentation. The tympanic membranes were visualize bilaterally, revealing retracted TMs bilaterally. Topical phenol was applied to the anterior-inferior quadrant of bilateral tympanic membrane(s). A myringotomy knife was used to make a radial incision in the tympanic membrane(s). A 3-suction was used to suction out fluid from the middle ear space. An alligator forceps was used to place a paparella PE tube in the myringotomy. The patient tolerated the procedure well. There were no complications with the procedure.       Follow-up in 6 months

## 2023-10-01 NOTE — TELEPHONE ENCOUNTER
Refill Request     CONFIRM preferred pharmacy with the patient. If Mail Order Rx - Pend for 90 day refill. Last Seen: Last Seen Department: 9/15/2023  Last Seen by PCP: 9/15/2023    Last Written: 3/28/2023    If no future appointment scheduled:  Review the last OV with PCP and review information for follow-up visit,  Route STAFF MESSAGE with patient name to the [facility] for scheduling with the following information:            -  Timing of next visit           -  Visit type ie Physical, OV, etc           -  Diagnoses/Reason ie. COPD, HTN - Do not use MEDICATION, Follow-up or CHECK UP - Give reason for visit      Next Appointment:   Future Appointments   Date Time Provider [address]   3/19/2024  1:30 PM Viola Urbina MD [facility] [place] - [facility]   4/10/2024  1:00 PM Denzel De La Garza DO [address] [facility]       Message sent to [address] [place] [address] to schedule appt with patient?   NO      Requested Prescriptions     Pending Prescriptions Disp Refills    ferrous sulfate (IRON 325) 325 (65 Fe) MG tablet [Pharmacy Med Name: FERROUS SULFATE 325 MG TABLET] 90 tablet 1     Sig: TAKE 1 TABLET BY MOUTH EVERY DAY WITH BREAKFAST

## 2023-10-03 RX ORDER — FERROUS SULFATE 325(65) MG
TABLET ORAL
Qty: 90 TABLET | Refills: 1 | Status: SHIPPED | OUTPATIENT
Start: 2023-10-03

## 2023-11-11 DIAGNOSIS — J45.30 MILD PERSISTENT ASTHMA WITHOUT COMPLICATION: ICD-10-CM

## 2023-11-11 DIAGNOSIS — J30.9 ALLERGIC RHINITIS, UNSPECIFIED SEASONALITY, UNSPECIFIED TRIGGER: ICD-10-CM

## 2023-11-11 NOTE — TELEPHONE ENCOUNTER
Refill Request     CONFIRM preferred pharmacy with the patient. If Mail Order Rx - Pend for 90 day refill. Last Seen: Last Seen Department: 9/15/2023  Last Seen by PCP: 9/15/2023    Last Written: 5/15/23 90 tabs 1 refill     If no future appointment scheduled:  Review the last OV with PCP and review information for follow-up visit,  Route STAFF MESSAGE with patient name to the [facility] for scheduling with the following information:            -  Timing of next visit           -  Visit type ie Physical, OV, etc           -  Diagnoses/Reason ie. COPD, HTN - Do not use MEDICATION, Follow-up or CHECK UP - Give reason for visit      Next Appointment:   Future Appointments   Date Time Provider [address]   3/19/2024  1:30 PM [facility] [place]   4/10/2024  1:00 PM Arlen Snyder [facility] [facility]       Message sent to [facility] to schedule appt with patient?   NO      Requested Prescriptions     Pending Prescriptions Disp Refills    montelukast (SINGULAIR) 10 MG tablet [Pharmacy Med Name: MONTELUKAST SOD 10 MG TABLET] 90 tablet 1     Sig: TAKE 1 TABLET BY MOUTH EVERY DAY EVERY NIGHT

## 2023-11-12 RX ORDER — MONTELUKAST SODIUM 10 MG/1
TABLET ORAL
Qty: 90 TABLET | Refills: 1 | Status: SHIPPED | OUTPATIENT
Start: 2023-11-12

## 2023-12-16 ENCOUNTER — APPOINTMENT (OUTPATIENT)
Dept: CT IMAGING | Age: 43
End: 2023-12-16
Payer: COMMERCIAL

## 2023-12-16 ENCOUNTER — HOSPITAL ENCOUNTER (EMERGENCY)
Age: 43
Discharge: HOME | End: 2023-12-16
Attending: EMERGENCY MEDICINE
Payer: COMMERCIAL

## 2023-12-16 ENCOUNTER — APPOINTMENT (OUTPATIENT)
Dept: ULTRASOUND IMAGING | Age: 43
End: 2023-12-16
Payer: COMMERCIAL

## 2023-12-16 ENCOUNTER — APPOINTMENT (OUTPATIENT)
Dept: GENERAL RADIOLOGY | Age: 43
End: 2023-12-16
Payer: COMMERCIAL

## 2023-12-16 VITALS
TEMPERATURE: 97.9 F | RESPIRATION RATE: 20 BRPM | BODY MASS INDEX: 24.54 KG/M2 | HEIGHT: 60 IN | DIASTOLIC BLOOD PRESSURE: 78 MMHG | WEIGHT: 125 LBS | HEART RATE: 77 BPM | SYSTOLIC BLOOD PRESSURE: 116 MMHG | OXYGEN SATURATION: 99 %

## 2023-12-16 DIAGNOSIS — K80.80 BILIARY CALCULUS OF OTHER SITE WITHOUT OBSTRUCTION: Primary | ICD-10-CM

## 2023-12-16 LAB
ALBUMIN SERPL-MCNC: 3.9 G/DL (ref 3.4–5)
ALBUMIN/GLOB SERPL: 1.4 {RATIO} (ref 1.1–2.2)
ALP SERPL-CCNC: 98 U/L (ref 40–129)
ALT SERPL-CCNC: 68 U/L (ref 10–40)
ANION GAP SERPL CALCULATED.3IONS-SCNC: 9 MMOL/L (ref 3–16)
AST SERPL-CCNC: 151 U/L (ref 15–37)
BASOPHILS # BLD: 0 K/UL (ref 0–0.2)
BASOPHILS NFR BLD: 0.4 %
BILIRUB SERPL-MCNC: 0.7 MG/DL (ref 0–1)
BUN SERPL-MCNC: 6 MG/DL (ref 7–20)
CALCIUM SERPL-MCNC: 8.7 MG/DL (ref 8.3–10.6)
CHLORIDE SERPL-SCNC: 103 MMOL/L (ref 99–110)
CO2 SERPL-SCNC: 27 MMOL/L (ref 21–32)
CREAT SERPL-MCNC: 0.6 MG/DL (ref 0.6–1.1)
DEPRECATED RDW RBC AUTO: 13.1 % (ref 12.4–15.4)
EKG ATRIAL RATE: 79 BPM
EKG DIAGNOSIS: NORMAL
EKG P AXIS: 52 DEGREES
EKG P-R INTERVAL: 154 MS
EKG Q-T INTERVAL: 402 MS
EKG QRS DURATION: 84 MS
EKG QTC CALCULATION (BAZETT): 460 MS
EKG R AXIS: 31 DEGREES
EKG T AXIS: 59 DEGREES
EKG VENTRICULAR RATE: 79 BPM
EOSINOPHIL # BLD: 0.1 K/UL (ref 0–0.6)
EOSINOPHIL NFR BLD: 1.1 %
GFR SERPLBLD CREATININE-BSD FMLA CKD-EPI: >60 ML/MIN/{1.73_M2}
GLUCOSE SERPL-MCNC: 103 MG/DL (ref 70–99)
HCT VFR BLD AUTO: 39.8 % (ref 36–48)
HGB BLD-MCNC: 13.6 G/DL (ref 12–16)
LIPASE SERPL-CCNC: 47 U/L (ref 13–60)
LYMPHOCYTES # BLD: 2.1 K/UL (ref 1–5.1)
LYMPHOCYTES NFR BLD: 22.5 %
MAGNESIUM SERPL-MCNC: 1.9 MG/DL (ref 1.8–2.4)
MCH RBC QN AUTO: 30.6 PG (ref 26–34)
MCHC RBC AUTO-ENTMCNC: 34.3 G/DL (ref 31–36)
MCV RBC AUTO: 89.1 FL (ref 80–100)
MONOCYTES # BLD: 0.4 K/UL (ref 0–1.3)
MONOCYTES NFR BLD: 4.7 %
NEUTROPHILS # BLD: 6.7 K/UL (ref 1.7–7.7)
NEUTROPHILS NFR BLD: 71.3 %
PLATELET # BLD AUTO: 253 K/UL (ref 135–450)
PMV BLD AUTO: 8.8 FL (ref 5–10.5)
POTASSIUM SERPL-SCNC: 3.4 MMOL/L (ref 3.5–5.1)
PROT SERPL-MCNC: 6.6 G/DL (ref 6.4–8.2)
RBC # BLD AUTO: 4.46 M/UL (ref 4–5.2)
SODIUM SERPL-SCNC: 139 MMOL/L (ref 136–145)
TROPONIN, HIGH SENSITIVITY: <6 NG/L (ref 0–14)
TROPONIN, HIGH SENSITIVITY: <6 NG/L (ref 0–14)
WBC # BLD AUTO: 9.3 K/UL (ref 4–11)

## 2023-12-16 PROCEDURE — 80053 COMPREHEN METABOLIC PANEL: CPT

## 2023-12-16 PROCEDURE — 93005 ELECTROCARDIOGRAM TRACING: CPT | Performed by: EMERGENCY MEDICINE

## 2023-12-16 PROCEDURE — 71275 CT ANGIOGRAPHY CHEST: CPT

## 2023-12-16 PROCEDURE — 71045 X-RAY EXAM CHEST 1 VIEW: CPT

## 2023-12-16 PROCEDURE — 6360000004 HC RX CONTRAST MEDICATION: Performed by: PHYSICIAN ASSISTANT

## 2023-12-16 PROCEDURE — 6370000000 HC RX 637 (ALT 250 FOR IP): Performed by: PHYSICIAN ASSISTANT

## 2023-12-16 PROCEDURE — 83735 ASSAY OF MAGNESIUM: CPT

## 2023-12-16 PROCEDURE — 93010 ELECTROCARDIOGRAM REPORT: CPT | Performed by: INTERNAL MEDICINE

## 2023-12-16 PROCEDURE — 6370000000 HC RX 637 (ALT 250 FOR IP): Performed by: EMERGENCY MEDICINE

## 2023-12-16 PROCEDURE — 76705 ECHO EXAM OF ABDOMEN: CPT

## 2023-12-16 PROCEDURE — 85025 COMPLETE CBC W/AUTO DIFF WBC: CPT

## 2023-12-16 PROCEDURE — 84484 ASSAY OF TROPONIN QUANT: CPT

## 2023-12-16 PROCEDURE — 83690 ASSAY OF LIPASE: CPT

## 2023-12-16 PROCEDURE — 6360000002 HC RX W HCPCS: Performed by: EMERGENCY MEDICINE

## 2023-12-16 RX ORDER — ONDANSETRON 4 MG/1
4 TABLET, FILM COATED ORAL 3 TIMES DAILY PRN
Qty: 30 TABLET | Refills: 0 | Status: SHIPPED | OUTPATIENT
Start: 2023-12-16

## 2023-12-16 RX ORDER — CIPROFLOXACIN 500 MG/1
500 TABLET, FILM COATED ORAL ONCE
Status: COMPLETED | OUTPATIENT
Start: 2023-12-16 | End: 2023-12-16

## 2023-12-16 RX ORDER — METRONIDAZOLE 250 MG/1
500 TABLET ORAL ONCE
Status: COMPLETED | OUTPATIENT
Start: 2023-12-16 | End: 2023-12-16

## 2023-12-16 RX ORDER — POTASSIUM CHLORIDE 20 MEQ/1
40 TABLET, EXTENDED RELEASE ORAL ONCE
Status: COMPLETED | OUTPATIENT
Start: 2023-12-16 | End: 2023-12-16

## 2023-12-16 RX ORDER — ONDANSETRON 2 MG/ML
4 INJECTION INTRAMUSCULAR; INTRAVENOUS ONCE
Status: COMPLETED | OUTPATIENT
Start: 2023-12-16 | End: 2023-12-16

## 2023-12-16 RX ORDER — METRONIDAZOLE 500 MG/1
500 TABLET ORAL 2 TIMES DAILY
Qty: 20 TABLET | Refills: 0 | Status: SHIPPED | OUTPATIENT
Start: 2023-12-16 | End: 2023-12-26

## 2023-12-16 RX ORDER — CIPROFLOXACIN 500 MG/1
500 TABLET, FILM COATED ORAL 2 TIMES DAILY
Qty: 20 TABLET | Refills: 0 | Status: SHIPPED | OUTPATIENT
Start: 2023-12-16 | End: 2023-12-26

## 2023-12-16 RX ORDER — POTASSIUM CHLORIDE 20 MEQ/1
20 TABLET, EXTENDED RELEASE ORAL ONCE
Status: COMPLETED | OUTPATIENT
Start: 2023-12-16 | End: 2023-12-16

## 2023-12-16 RX ORDER — ASPIRIN 325 MG
325 TABLET ORAL ONCE
Status: COMPLETED | OUTPATIENT
Start: 2023-12-16 | End: 2023-12-16

## 2023-12-16 RX ADMIN — POTASSIUM CHLORIDE 40 MEQ: 1500 TABLET, EXTENDED RELEASE ORAL at 14:38

## 2023-12-16 RX ADMIN — METRONIDAZOLE 500 MG: 250 TABLET ORAL at 16:08

## 2023-12-16 RX ADMIN — ONDANSETRON 4 MG: 2 INJECTION INTRAMUSCULAR; INTRAVENOUS at 16:08

## 2023-12-16 RX ADMIN — CIPROFLOXACIN HYDROCHLORIDE 500 MG: 500 TABLET, FILM COATED ORAL at 16:08

## 2023-12-16 RX ADMIN — ASPIRIN 325 MG: 325 TABLET ORAL at 11:36

## 2023-12-16 RX ADMIN — IOPAMIDOL 75 ML: 755 INJECTION, SOLUTION INTRAVENOUS at 12:11

## 2023-12-16 RX ADMIN — POTASSIUM CHLORIDE 20 MEQ: 1500 TABLET, EXTENDED RELEASE ORAL at 16:08

## 2023-12-16 ASSESSMENT — PAIN DESCRIPTION - LOCATION: LOCATION: BACK;CHEST

## 2023-12-16 ASSESSMENT — PAIN SCALES - GENERAL
PAINLEVEL_OUTOF10: 4
PAINLEVEL_OUTOF10: 5

## 2023-12-16 ASSESSMENT — PAIN - FUNCTIONAL ASSESSMENT: PAIN_FUNCTIONAL_ASSESSMENT: 0-10

## 2023-12-16 NOTE — ED PROVIDER NOTES
I have reviewed the below EKG. I was not otherwise involved in this patient's care. EKG  The Ekg interpreted by myself  normal sinus rhythm with a rate of 79  Axis is   Normal  QTc is  normal  Intervals and Durations are unremarkable. No specific ST-T wave changes appreciated. No evidence of acute ischemia.    No significant change from prior EKG dated 6/28/2017        Radha Kulkarni MD  12/16/23 [id number]

## 2023-12-16 NOTE — ED PROVIDER NOTES
Emergency Department Attending Provider Note  Location: [facility]  ED  12/16/2023     Chief Complaint   Patient presents with    Chest Pain     Started this morning        PATIENT ID:  Pasha Holm is a 37 y.o. female    Past Medical History:   Diagnosis Date    Asthma     Depression     ONLY POSTPARTEM    WALI (generalized anxiety disorder) 05/25/2022    GERD (gastroesophageal reflux disease) 05/30/2019    Hyperlipidemia 03/08/2013    Obesity     Right carpal tunnel syndrome 12/03/2018    Type II or unspecified type diabetes mellitus without mention of complication, not stated as uncontrolled        Elvira JULIO Moreira was evaluated in the Emergency Department for concerns of left-sided chest and upper abdominal pain, that is now more in the epigastric and midsternal in nature that started this morning. She thought it would go any away on its own, but it has not helped at all. Otherwise, on my elevation the Emergency Department, she says she feels much better. No history of vomiting, but has intermittent nausea, status post bariatric surgery in the distant past.  No dysuria hematuria    Although initial history and physical exam information was obtained by midlevel provider (who also dictated a record of this visit), I personally saw the patient and performed a substantive portion of the visit including all aspects of the medical decision making. Vitals:    12/16/23 1253   BP: 122/78   Pulse: 77   Resp: 14   Temp:    SpO2: 98%        BASIC EXAM:  On my exam, is alert, interactive, conversational, no acute distress. No leg swelling. Talking full sentences. p    EKG Interpretation:  Per Dr. Leonora Howell   Final Result   Cholelithiasis with gallbladder wall thickening measuring up to 6.2 mm. RECOMMENDATIONS:   Recommend surgical consultation if there is clinical concern for acute   cholecystitis and nuclear medicine HIDA scan.          CTA CHEST W CONTRAST   Final Result

## 2023-12-16 NOTE — CONSULTS
1447 - PS to Dr Maureen Gutierres at general surgery per consult  Re: cholelithiasis  56 - Dr Maureen Gutierres called back to speak with [facility]

## 2023-12-17 NOTE — ED PROVIDER NOTES
[facility] Emergency Department    CHIEF COMPLAINT  Chest Pain (Started this morning)      SHARED SERVICE VISIT  I have seen and evaluated this patient with my supervising physician, Dr. Patricia Mendoza. HISTORY OF PRESENT ILLNESS  Elvira Moreira is a 37 y.o. female who presents to the ED complaining of right-sided chest pain with radiation to her chest.  She describes her pain as sharp in nature and has been ongoing since she woke up this morning. She is never felt symptoms like this in the past.  She has no known history of coronary artery disease or cardiac risk factors. Did not take any medications for this at the time. Denies any headache, body ache, fevers or chills. She denies any coughing or sneezing. She denies any sore throat or congestion. She denies any vision changes or dizziness. She denies any shortness of breath or dyspnea on exertion. She denies any nausea, vomiting, or abdominal pain. She denies any urinary symptoms. She denies any diarrhea or bloody stools. She denies any new onset back pain. She denies any recent travel or sick contacts. No other complaints, modifying factors or associated symptoms. Nursing notes reviewed.    Past Medical History:   Diagnosis Date    Asthma     Depression     ONLY POSTPARTEM    WALI (generalized anxiety disorder) 2022    GERD (gastroesophageal reflux disease) 2019    Hyperlipidemia 2013    Obesity     Right carpal tunnel syndrome 2018    Type II or unspecified type diabetes mellitus without mention of complication, not stated as uncontrolled      Past Surgical History:   Procedure Laterality Date    BREAST SURGERY      MILK DUCT     SECTION  , 3/04,     HYSTERECTOMY, TOTAL ABDOMINAL (CERVIX REMOVED)      LASIK  2005    SLEEVE GASTRECTOMY  10/21/2015    laproscopic    TONSILLECTOMY  1989    UPPER GASTROINTESTINAL ENDOSCOPY  2015     Family History   Problem